# Patient Record
Sex: FEMALE | Race: WHITE | NOT HISPANIC OR LATINO | Employment: FULL TIME | URBAN - NONMETROPOLITAN AREA
[De-identification: names, ages, dates, MRNs, and addresses within clinical notes are randomized per-mention and may not be internally consistent; named-entity substitution may affect disease eponyms.]

---

## 2017-01-24 ENCOUNTER — OFFICE VISIT (OUTPATIENT)
Dept: FAMILY MEDICINE CLINIC | Facility: CLINIC | Age: 27
End: 2017-01-24

## 2017-01-24 VITALS
HEART RATE: 78 BPM | SYSTOLIC BLOOD PRESSURE: 118 MMHG | TEMPERATURE: 97.6 F | OXYGEN SATURATION: 99 % | WEIGHT: 244 LBS | DIASTOLIC BLOOD PRESSURE: 78 MMHG

## 2017-01-24 DIAGNOSIS — R53.83 OTHER FATIGUE: ICD-10-CM

## 2017-01-24 DIAGNOSIS — N91.2 AMENORRHEA: Primary | ICD-10-CM

## 2017-01-24 LAB
B-HCG UR QL: POSITIVE
INTERNAL NEGATIVE CONTROL: NEGATIVE
INTERNAL POSITIVE CONTROL: POSITIVE
Lab: ABNORMAL

## 2017-01-24 PROCEDURE — 99213 OFFICE O/P EST LOW 20 MIN: CPT | Performed by: NURSE PRACTITIONER

## 2017-01-24 PROCEDURE — 81025 URINE PREGNANCY TEST: CPT | Performed by: NURSE PRACTITIONER

## 2017-01-24 PROCEDURE — 36415 COLL VENOUS BLD VENIPUNCTURE: CPT | Performed by: NURSE PRACTITIONER

## 2017-01-24 RX ORDER — METFORMIN HYDROCHLORIDE 500 MG/1
3 TABLET, EXTENDED RELEASE ORAL DAILY
COMMUNITY
Start: 2016-12-29 | End: 2020-04-14

## 2017-01-24 RX ORDER — IBUPROFEN 800 MG/1
1 TABLET ORAL DAILY
COMMUNITY
Start: 2016-12-24 | End: 2020-04-14

## 2017-01-24 NOTE — MR AVS SNAPSHOT
Maria Ines Mccarthy   1/24/2017 2:30 PM   Office Visit    Dept Phone:  [phone]   Encounter #:  [id number]    Provider:  DEBRA Stevenson   Department:  [facility]                Your Full Care Plan              Your Updated Medication List          This list is accurate as of: 1/24/17  3:10 PM.  Always use your most recent med list.                ibuprofen 800 MG tablet   Commonly known as:  ADVIL,MOTRIN       metFORMIN  MG 24 hr tablet   Commonly known as:  GLUCOPHAGE-XR       VENTOLIN  (90 BASE) MCG/ACT inhaler   Generic drug:  albuterol               We Performed the Following     HCG, B-subunit, Quantitative     POC Pregnancy, Urine       You Were Diagnosed With        Codes Comments    Amenorrhea    -  Primary ICD-10-CM: N91.2  ICD-9-CM: 626.0     Other fatigue     ICD-10-CM: R53.83  ICD-9-CM: 780.79       Instructions     None    Patient Instructions History      Upcoming Appointments     Visit Type Date Time Department    SAME DAY 1/24/2017  2:30 PM [facility]      [employer] Signup     Our records indicate that you have declined [employer] signup. If you would like to sign up for [employer], please email [email or web address] or call [phone] to obtain an activation code.             Other Info from Your Visit           Allergies     No Known Allergies      Reason for Visit     Amenorrhea     Generalized Body Aches           Vital Signs     Blood Pressure Pulse Temperature Weight Oxygen Saturation Smoking Status    118/78 78 97.6 °F (36.4 °C) 244 lb (111 kg) 99% Current Every Day Smoker      Problems and Diagnoses Noted     Loss of menstrual periods    -  Primary    Malaise and fatigue

## 2017-01-24 NOTE — PROGRESS NOTES
Subjective   Maria Ines Mccarthy is a 26 y.o. female.     History of Present Illness     Amenorrhea  Maria Ines comes in today with generalized fatigue, decreased energy and breast tenderness.  Last Mr.  Was 12/03/2016.  She is apparently been on metformin for 3 months in the attempt to get pregnant.    The following portions of the patient's history were reviewed and updated as appropriate: allergies, current medications, past family history, past medical history, past social history, past surgical history and problem list.    Review of Systems   Constitutional: Positive for fatigue.   HENT: Negative.    Respiratory: Negative.    Cardiovascular: Negative.    Gastrointestinal: Negative.    Musculoskeletal: Negative for arthralgias and myalgias.   Neurological: Negative.        Objective   Physical Exam   Constitutional: She is oriented to person, place, and time. She appears well-developed and well-nourished. No distress.   HENT:   Head: Normocephalic and atraumatic.   Eyes: Conjunctivae are normal. Pupils are equal, round, and reactive to light.   Neck: Neck supple.   Cardiovascular: Normal rate and regular rhythm.    Pulmonary/Chest: Effort normal and breath sounds normal.   Neurological: She is alert and oriented to person, place, and time.   Skin: Skin is warm.       Assessment/Plan   Maria Ines was seen today for amenorrhea and generalized body aches.    Diagnoses and all orders for this visit:    Amenorrhea  -     HCG, B-subunit, Quantitative  -     POC Pregnancy, Urine    Other fatigue      UPT in house is positive, we have drawn a serum quantitative hCG for confirmation, I will call her tomorrow with results.

## 2017-01-25 ENCOUNTER — TELEPHONE (OUTPATIENT)
Dept: FAMILY MEDICINE CLINIC | Facility: CLINIC | Age: 27
End: 2017-01-25

## 2017-01-25 LAB — HCG INTACT+B SERPL-ACNC: NORMAL MIU/ML

## 2017-01-25 NOTE — TELEPHONE ENCOUNTER
----- Message from DEBRA Stevenson sent at 1/25/2017  7:37 AM EST -----  Please let Maria Ines know her serum pregnancy test is positive, it appears she is about 7-8 weeks

## 2017-01-25 NOTE — TELEPHONE ENCOUNTER
[facility] CALLED THIS MORNING AND WANTED ME TO ASK YOU A QUESTION 2 YEARS AGO SHE WAS ON THE HCG DIET AND WANTED TO KNOW IF THIS COULD HAVE AFFECTED HER PREGNANCY TEST

## 2017-01-25 NOTE — TELEPHONE ENCOUNTER
Pt advised of lab results and wanted to have us send these results to Josseline Zamorano.  She will call us if she needs us.

## 2017-01-25 NOTE — PROGRESS NOTES
Please let Maria Ines know her serum pregnancy test is positive, it appears she is about 7-8 weeks

## 2017-01-26 NOTE — TELEPHONE ENCOUNTER
i do not know for certain but I think it is highly unlikely this would cause a false positive as it was years ago.

## 2017-03-06 ENCOUNTER — OFFICE VISIT (OUTPATIENT)
Dept: FAMILY MEDICINE CLINIC | Facility: CLINIC | Age: 27
End: 2017-03-06

## 2017-03-06 VITALS
OXYGEN SATURATION: 99 % | TEMPERATURE: 97.6 F | SYSTOLIC BLOOD PRESSURE: 124 MMHG | HEART RATE: 84 BPM | WEIGHT: 247 LBS | DIASTOLIC BLOOD PRESSURE: 82 MMHG | HEIGHT: 64 IN | BODY MASS INDEX: 42.17 KG/M2

## 2017-03-06 DIAGNOSIS — R05.9 COUGH: Primary | ICD-10-CM

## 2017-03-06 LAB
EXPIRATION DATE: NORMAL
FLUAV AG NPH QL: NEGATIVE
FLUBV AG NPH QL: NEGATIVE
INTERNAL CONTROL: NORMAL
Lab: NORMAL

## 2017-03-06 PROCEDURE — 87804 INFLUENZA ASSAY W/OPTIC: CPT | Performed by: NURSE PRACTITIONER

## 2017-03-06 PROCEDURE — 99213 OFFICE O/P EST LOW 20 MIN: CPT | Performed by: NURSE PRACTITIONER

## 2017-03-06 RX ORDER — HYDROXYZINE HYDROCHLORIDE 25 MG/1
1 TABLET, FILM COATED ORAL AS NEEDED
COMMUNITY
Start: 2017-02-09 | End: 2020-04-14

## 2017-03-06 NOTE — PROGRESS NOTES
Subjective   Maria Ines Mccarthy is a 26 y.o. female.     History of Present Illness   Maria Ines is here today with complaint of cough and congestion for 1 week, also having some chest and breast tenderness, but is 13 weeks pregnant She thinks she may have the Flu, is still smoking, but plans to quit. Denies fever and chills.  The following portions of the patient's history were reviewed and updated as appropriate: allergies, current medications, past family history, past medical history, past social history, past surgical history and problem list.    Review of Systems   Constitutional: Negative.    HENT: Positive for congestion and rhinorrhea. Negative for ear discharge, ear pain, sinus pressure, sneezing and sore throat.    Eyes: Negative.    Respiratory: Positive for cough. Negative for apnea, choking, chest tightness, shortness of breath, wheezing and stridor.    Cardiovascular: Negative.    Gastrointestinal: Negative.    Endocrine: Negative.    Musculoskeletal: Negative.    Skin: Negative.    Neurological: Negative.    Hematological: Negative.    Psychiatric/Behavioral: Negative.        Objective   Physical Exam   Constitutional: She is oriented to person, place, and time. She appears well-developed and well-nourished. No distress.   HENT:   Head: Normocephalic and atraumatic.   Right Ear: Tympanic membrane, external ear and ear canal normal.   Left Ear: Tympanic membrane, external ear and ear canal normal.   Nose: Nose normal.   Mouth/Throat: Oropharynx is clear and moist and mucous membranes are normal. No oropharyngeal exudate, posterior oropharyngeal edema or posterior oropharyngeal erythema.   Eyes: Conjunctivae and EOM are normal. No scleral icterus.   Neck: Normal range of motion. Neck supple.   Cardiovascular: Normal rate, regular rhythm and normal heart sounds.  Exam reveals no friction rub.    No murmur heard.  No bruit   Pulmonary/Chest: Effort normal. No respiratory distress. She has no rales.   Scattered  rhonchi that clears with cough   Lymphadenopathy:     She has no cervical adenopathy.   Neurological: She is alert and oriented to person, place, and time.   Skin: Skin is warm. No erythema. No pallor.   No cyanosis   Psychiatric: She has a normal mood and affect. Her behavior is normal. Judgment and thought content normal.   Nursing note and vitals reviewed.      Assessment/Plan   Maria Ines was seen today for cough.    Diagnoses and all orders for this visit:    Cough  -     POCT Influenza A/B      Results for orders placed or performed in visit on 03/06/17   POCT Influenza A/B   Result Value Ref Range    Rapid Influenza A Ag negative     Rapid Influenza B Ag negative     Internal Control Passed Passed    Lot Number [id number]     Expiration Date 6/18      Flu test negative in clinic today, I gave patient a written prescription for Amoxicillin and told her to fill if symptoms do not improve in a day or two. I advised patient to quit smoking, increase fluids, she may take benadryl during pregnancy for symptom relief.   Patient was encouraged to keep me informed of any acute changes, lack of improvement, or any new concerning symptoms.Smoking cessation advised.  Pt voiced understanding of health risks of cont' smoking.  Patient verbalized understanding of instructions and denied further questions.  Written by Catherine TAYLOR, APRN student, acting as a scribe for DEBRA Edmonds  This note accurately reflects work and decisions made by myself, Conchis WATSON

## 2020-04-14 ENCOUNTER — APPOINTMENT (OUTPATIENT)
Dept: CT IMAGING | Facility: HOSPITAL | Age: 30
End: 2020-04-14

## 2020-04-14 ENCOUNTER — HOSPITAL ENCOUNTER (EMERGENCY)
Facility: HOSPITAL | Age: 30
Discharge: HOME | End: 2020-04-14
Attending: EMERGENCY MEDICINE

## 2020-04-14 VITALS
HEIGHT: 64 IN | TEMPERATURE: 98.1 F | DIASTOLIC BLOOD PRESSURE: 72 MMHG | OXYGEN SATURATION: 99 % | WEIGHT: 156 LBS | BODY MASS INDEX: 26.63 KG/M2 | RESPIRATION RATE: 18 BRPM | SYSTOLIC BLOOD PRESSURE: 126 MMHG | HEART RATE: 87 BPM

## 2020-04-14 DIAGNOSIS — R33.8 ACUTE URINARY RETENTION: ICD-10-CM

## 2020-04-14 DIAGNOSIS — K62.89 PROCTITIS: Primary | ICD-10-CM

## 2020-04-14 LAB
B-HCG UR QL: NEGATIVE
BILIRUB UR QL STRIP: NEGATIVE
CLARITY UR: CLEAR
COLOR UR: YELLOW
GLUCOSE UR STRIP-MCNC: NEGATIVE MG/DL
HGB UR QL STRIP.AUTO: NEGATIVE
INTERNAL NEGATIVE CONTROL: NEGATIVE
INTERNAL POSITIVE CONTROL: POSITIVE
KETONES UR QL STRIP: NEGATIVE
LEUKOCYTE ESTERASE UR QL STRIP.AUTO: NEGATIVE
Lab: NORMAL
NITRITE UR QL STRIP: NEGATIVE
PH UR STRIP.AUTO: 7 [PH] (ref 5–8)
PROT UR QL STRIP: NEGATIVE
SP GR UR STRIP: 1.01 (ref 1–1.03)
UROBILINOGEN UR QL STRIP: NORMAL

## 2020-04-14 PROCEDURE — 74176 CT ABD & PELVIS W/O CONTRAST: CPT

## 2020-04-14 PROCEDURE — 81003 URINALYSIS AUTO W/O SCOPE: CPT | Performed by: EMERGENCY MEDICINE

## 2020-04-14 PROCEDURE — 81025 URINE PREGNANCY TEST: CPT | Performed by: EMERGENCY MEDICINE

## 2020-04-14 PROCEDURE — 51702 INSERT TEMP BLADDER CATH: CPT

## 2020-04-14 PROCEDURE — 99283 EMERGENCY DEPT VISIT LOW MDM: CPT

## 2020-04-14 RX ORDER — AMOXICILLIN AND CLAVULANATE POTASSIUM 875; 125 MG/1; MG/1
1 TABLET, FILM COATED ORAL EVERY 12 HOURS
Qty: 14 TABLET | Refills: 0 | Status: SHIPPED | OUTPATIENT
Start: 2020-04-14 | End: 2020-04-21

## 2020-04-14 RX ORDER — MULTIVIT AND MINERALS-FERROUS GLUCONATE 9 MG IRON/15 ML ORAL LIQUID 9 MG/15 ML
LIQUID (ML) ORAL DAILY
COMMUNITY

## 2020-04-14 NOTE — ED PROVIDER NOTES
Subjective   29-year-old female presents with the complaint of pain in the lower central portion of her back.  She also reports that she has had difficulty urinating for the last 3 days.  She reports that she was actually discharged from [facility] yesterday.  She was admitted at [facility] approximately 3 days for upper abdominal pain.  She reports she received a scope and was identified to have an ulcer in her upper gastrointestinal system.  She does have a previous history of a gastric sleeve.  She states that she inform them of her lower abdominal pain and low back pain, but did not receive any evaluation while she was there.  She was discharged from the hospital with oxycodone, she reports taking it without any relief of her current pain.  She denies any fever.  No chest pain or difficulty breathing.  No cough or shortness of breath.  No other reported aggravating, alleviating, or associated symptoms.          Review of Systems   Constitutional: Negative for chills, fatigue and fever.   HENT: Negative for congestion, ear pain, postnasal drip, sinus pressure and sore throat.    Eyes: Negative for pain, redness and visual disturbance.   Respiratory: Negative for cough, chest tightness and shortness of breath.    Cardiovascular: Negative for chest pain, palpitations and leg swelling.   Gastrointestinal: Positive for abdominal pain. Negative for anal bleeding, blood in stool, diarrhea, nausea and vomiting.   Endocrine: Negative for polydipsia and polyuria.   Genitourinary: Positive for difficulty urinating. Negative for dysuria, frequency and urgency.   Musculoskeletal: Positive for back pain (low back pain). Negative for arthralgias and neck pain.   Skin: Negative for pallor and rash.   Allergic/Immunologic: Negative for environmental allergies and immunocompromised state.   Neurological: Negative for dizziness, weakness and headaches.   Hematological: Negative for adenopathy.    Psychiatric/Behavioral: Negative for confusion, self-injury and suicidal ideas. The patient is not nervous/anxious.    All other systems reviewed and are negative.      Past Medical History:   Diagnosis Date   • Anxiety    • Asthma    • Dysuria    • Glossitis    •  0    • Sore throat    • Strep pharyngitis        No Known Allergies    Past Surgical History:   Procedure Laterality Date   • CHOLECYSTECTOMY     • GASTRIC SLEEVE LAPAROSCOPIC         History reviewed. No pertinent family history.    Social History     Socioeconomic History   • Marital status: Single     Spouse name: Not on file   • Number of children: Not on file   • Years of education: Not on file   • Highest education level: Not on file   Tobacco Use   • Smoking status: Current Every Day Smoker   • Tobacco comment: 1-5 A DAY   Substance and Sexual Activity   • Alcohol use: Not Currently           Objective   Physical Exam   Constitutional: She is oriented to person, place, and time. She appears well-developed and well-nourished.  Non-toxic appearance. No distress.   HENT:   Head: Normocephalic and atraumatic.   Right Ear: External ear normal.   Left Ear: External ear normal.   Nose: Nose normal.   Eyes: Pupils are equal, round, and reactive to light. EOM and lids are normal.   Neck: Normal range of motion. Neck supple. No tracheal deviation present.   Cardiovascular: Normal rate, regular rhythm and normal heart sounds. Exam reveals no gallop, no friction rub and no decreased pulses.   No murmur heard.  Pulmonary/Chest: Effort normal and breath sounds normal. No respiratory distress. She has no decreased breath sounds. She has no wheezes. She has no rhonchi. She has no rales.   Abdominal: Soft. Normal appearance and bowel sounds are normal. There is tenderness in the suprapubic area. There is no rebound, no guarding, no tenderness at McBurney's point and negative Capps's sign.   Musculoskeletal: Normal range of motion. She exhibits no  deformity.   Lymphadenopathy:     She has no cervical adenopathy.   Neurological: She is alert and oriented to person, place, and time. She has normal strength. No cranial nerve deficit or sensory deficit.   Skin: Skin is warm and dry. No rash noted. She is not diaphoretic.   Psychiatric: She has a normal mood and affect. Her speech is normal and behavior is normal. Judgment and thought content normal. Cognition and memory are normal.   Nursing note and vitals reviewed.      Procedures           ED Course                                           MDM  Number of Diagnoses or Management Options  Acute urinary retention: new and requires workup  Proctitis: new and requires workup  Diagnosis management comments: Urine is clean with no signs of infection.  Garcia catheter was anchored.    CT scan is concerning for proctitis.  Patient denies history of ulcerative colitis or Crohn's disease.    The patient will be discharged with a course of Augmentin and will be referred to either colorectal surgery, Dr. Alvarez, or GI, Dr. Gusman.    She will also be referred to urology for further evaluation of urinary retention.    She is already been prescribed oxycodone and she is advised to take that as needed to help with pain.    Advised to rest and drink plenty of fluids.       Amount and/or Complexity of Data Reviewed  Clinical lab tests: ordered and reviewed  Tests in the radiology section of CPT®: ordered and reviewed  Review and summarize past medical records: yes  Independent visualization of images, tracings, or specimens: yes        Final diagnoses:   Proctitis   Acute urinary retention            Leighann Sanchez MD  04/14/20 0438

## 2020-04-14 NOTE — DISCHARGE INSTRUCTIONS
Advised to take antibiotics as prescribed.    Leave Garcia catheter in place until you follow-up with urologist within the next week.    Follow-up with GI or colorectal surgery for further outpatient evaluation for cause of proctitis.

## 2020-12-21 ENCOUNTER — APPOINTMENT (OUTPATIENT)
Dept: CT IMAGING | Facility: HOSPITAL | Age: 30
End: 2020-12-21

## 2020-12-21 ENCOUNTER — HOSPITAL ENCOUNTER (EMERGENCY)
Facility: HOSPITAL | Age: 30
Discharge: HOME | End: 2020-12-22
Attending: EMERGENCY MEDICINE | Admitting: EMERGENCY MEDICINE

## 2020-12-21 ENCOUNTER — APPOINTMENT (OUTPATIENT)
Dept: GENERAL RADIOLOGY | Facility: HOSPITAL | Age: 30
End: 2020-12-21

## 2020-12-21 DIAGNOSIS — R10.13 EPIGASTRIC ABDOMINAL PAIN: ICD-10-CM

## 2020-12-21 DIAGNOSIS — R10.33 PERIUMBILICAL ABDOMINAL PAIN: Primary | ICD-10-CM

## 2020-12-21 DIAGNOSIS — R11.2 NON-INTRACTABLE VOMITING WITH NAUSEA, UNSPECIFIED VOMITING TYPE: ICD-10-CM

## 2020-12-21 LAB
ALBUMIN SERPL-MCNC: 4.9 G/DL (ref 3.5–5.2)
ALBUMIN/GLOB SERPL: 1.4 G/DL
ALP SERPL-CCNC: 48 U/L (ref 39–117)
ALT SERPL W P-5'-P-CCNC: 17 U/L (ref 1–33)
ANION GAP SERPL CALCULATED.3IONS-SCNC: 14 MMOL/L (ref 5–15)
AST SERPL-CCNC: 18 U/L (ref 1–32)
B-HCG UR QL: NEGATIVE
BACTERIA UR QL AUTO: ABNORMAL /HPF
BASOPHILS # BLD AUTO: 0.01 10*3/MM3 (ref 0–0.2)
BASOPHILS NFR BLD AUTO: 0.2 % (ref 0–1.5)
BILIRUB SERPL-MCNC: 0.4 MG/DL (ref 0–1.2)
BILIRUB UR QL STRIP: NEGATIVE
BUN SERPL-MCNC: 13 MG/DL (ref 6–20)
BUN/CREAT SERPL: 24.1 (ref 7–25)
CALCIUM SPEC-SCNC: 9.7 MG/DL (ref 8.6–10.5)
CHLORIDE SERPL-SCNC: 107 MMOL/L (ref 98–107)
CLARITY UR: ABNORMAL
CO2 SERPL-SCNC: 19 MMOL/L (ref 22–29)
COLOR UR: YELLOW
CREAT SERPL-MCNC: 0.54 MG/DL (ref 0.57–1)
D-LACTATE SERPL-SCNC: 1.2 MMOL/L (ref 0.5–2)
DEPRECATED RDW RBC AUTO: 38.4 FL (ref 37–54)
EOSINOPHIL # BLD AUTO: 0.01 10*3/MM3 (ref 0–0.4)
EOSINOPHIL NFR BLD AUTO: 0.2 % (ref 0.3–6.2)
ERYTHROCYTE [DISTWIDTH] IN BLOOD BY AUTOMATED COUNT: 11.9 % (ref 12.3–15.4)
GFR SERPL CREATININE-BSD FRML MDRD: 134 ML/MIN/1.73
GLOBULIN UR ELPH-MCNC: 3.4 GM/DL
GLUCOSE SERPL-MCNC: 148 MG/DL (ref 65–99)
GLUCOSE UR STRIP-MCNC: NEGATIVE MG/DL
HCT VFR BLD AUTO: 41.7 % (ref 34–46.6)
HGB BLD-MCNC: 13.8 G/DL (ref 12–15.9)
HGB UR QL STRIP.AUTO: NEGATIVE
HOLD SPECIMEN: NORMAL
HOLD SPECIMEN: NORMAL
HYALINE CASTS UR QL AUTO: ABNORMAL /LPF
IMM GRANULOCYTES # BLD AUTO: 0.02 10*3/MM3 (ref 0–0.05)
IMM GRANULOCYTES NFR BLD AUTO: 0.3 % (ref 0–0.5)
INTERNAL NEGATIVE CONTROL: NEGATIVE
INTERNAL POSITIVE CONTROL: POSITIVE
KETONES UR QL STRIP: ABNORMAL
LEUKOCYTE ESTERASE UR QL STRIP.AUTO: NEGATIVE
LIPASE SERPL-CCNC: 49 U/L (ref 13–60)
LYMPHOCYTES # BLD AUTO: 0.94 10*3/MM3 (ref 0.7–3.1)
LYMPHOCYTES NFR BLD AUTO: 14.6 % (ref 19.6–45.3)
Lab: NORMAL
MCH RBC QN AUTO: 29 PG (ref 26.6–33)
MCHC RBC AUTO-ENTMCNC: 33.1 G/DL (ref 31.5–35.7)
MCV RBC AUTO: 87.6 FL (ref 79–97)
MONOCYTES # BLD AUTO: 0.05 10*3/MM3 (ref 0.1–0.9)
MONOCYTES NFR BLD AUTO: 0.8 % (ref 5–12)
NEUTROPHILS NFR BLD AUTO: 5.43 10*3/MM3 (ref 1.7–7)
NEUTROPHILS NFR BLD AUTO: 83.9 % (ref 42.7–76)
NITRITE UR QL STRIP: NEGATIVE
NRBC BLD AUTO-RTO: 0 /100 WBC (ref 0–0.2)
PH UR STRIP.AUTO: <=5 [PH] (ref 5–8)
PLATELET # BLD AUTO: 301 10*3/MM3 (ref 140–450)
PMV BLD AUTO: 9.7 FL (ref 6–12)
POTASSIUM SERPL-SCNC: 4.3 MMOL/L (ref 3.5–5.2)
PROT SERPL-MCNC: 8.3 G/DL (ref 6–8.5)
PROT UR QL STRIP: NEGATIVE
RBC # BLD AUTO: 4.76 10*6/MM3 (ref 3.77–5.28)
RBC # UR: ABNORMAL /HPF
REF LAB TEST METHOD: ABNORMAL
SODIUM SERPL-SCNC: 140 MMOL/L (ref 136–145)
SP GR UR STRIP: 1.02 (ref 1–1.03)
SQUAMOUS #/AREA URNS HPF: ABNORMAL /HPF
UROBILINOGEN UR QL STRIP: ABNORMAL
WBC # BLD AUTO: 6.46 10*3/MM3 (ref 3.4–10.8)
WBC UR QL AUTO: ABNORMAL /HPF
WHOLE BLOOD HOLD SPECIMEN: NORMAL
WHOLE BLOOD HOLD SPECIMEN: NORMAL

## 2020-12-21 PROCEDURE — 74177 CT ABD & PELVIS W/CONTRAST: CPT

## 2020-12-21 PROCEDURE — 96375 TX/PRO/DX INJ NEW DRUG ADDON: CPT

## 2020-12-21 PROCEDURE — 83605 ASSAY OF LACTIC ACID: CPT | Performed by: EMERGENCY MEDICINE

## 2020-12-21 PROCEDURE — 25010000002 ONDANSETRON PER 1 MG: Performed by: EMERGENCY MEDICINE

## 2020-12-21 PROCEDURE — 96374 THER/PROPH/DIAG INJ IV PUSH: CPT

## 2020-12-21 PROCEDURE — 25010000002 HYDROMORPHONE PER 4 MG: Performed by: EMERGENCY MEDICINE

## 2020-12-21 PROCEDURE — 80053 COMPREHEN METABOLIC PANEL: CPT

## 2020-12-21 PROCEDURE — 99283 EMERGENCY DEPT VISIT LOW MDM: CPT

## 2020-12-21 PROCEDURE — 83690 ASSAY OF LIPASE: CPT

## 2020-12-21 PROCEDURE — 71045 X-RAY EXAM CHEST 1 VIEW: CPT

## 2020-12-21 PROCEDURE — 81025 URINE PREGNANCY TEST: CPT | Performed by: EMERGENCY MEDICINE

## 2020-12-21 PROCEDURE — 81001 URINALYSIS AUTO W/SCOPE: CPT | Performed by: EMERGENCY MEDICINE

## 2020-12-21 PROCEDURE — 85025 COMPLETE CBC W/AUTO DIFF WBC: CPT

## 2020-12-21 RX ORDER — ONDANSETRON 2 MG/ML
4 INJECTION INTRAMUSCULAR; INTRAVENOUS ONCE
Status: COMPLETED | OUTPATIENT
Start: 2020-12-21 | End: 2020-12-21

## 2020-12-21 RX ORDER — SODIUM CHLORIDE 9 MG/ML
10 INJECTION INTRAVENOUS AS NEEDED
Status: DISCONTINUED | OUTPATIENT
Start: 2020-12-21 | End: 2020-12-22 | Stop reason: HOSPADM

## 2020-12-21 RX ORDER — HYDROMORPHONE HYDROCHLORIDE 1 MG/ML
0.5 INJECTION, SOLUTION INTRAMUSCULAR; INTRAVENOUS; SUBCUTANEOUS ONCE
Status: COMPLETED | OUTPATIENT
Start: 2020-12-21 | End: 2020-12-21

## 2020-12-21 RX ADMIN — ONDANSETRON 4 MG: 2 INJECTION INTRAMUSCULAR; INTRAVENOUS at 23:10

## 2020-12-21 RX ADMIN — SODIUM CHLORIDE 1000 ML: 9 INJECTION, SOLUTION INTRAVENOUS at 23:09

## 2020-12-21 RX ADMIN — IOPAMIDOL 100 ML: 612 INJECTION, SOLUTION INTRAVENOUS at 23:58

## 2020-12-21 RX ADMIN — HYDROMORPHONE HYDROCHLORIDE 0.5 MG: 1 INJECTION, SOLUTION INTRAMUSCULAR; INTRAVENOUS; SUBCUTANEOUS at 23:09

## 2020-12-22 VITALS
BODY MASS INDEX: 26.46 KG/M2 | SYSTOLIC BLOOD PRESSURE: 99 MMHG | HEIGHT: 64 IN | RESPIRATION RATE: 16 BRPM | DIASTOLIC BLOOD PRESSURE: 56 MMHG | TEMPERATURE: 98.6 F | OXYGEN SATURATION: 98 % | HEART RATE: 103 BPM | WEIGHT: 155 LBS

## 2020-12-22 PROCEDURE — 25010000002 HYDROMORPHONE PER 4 MG: Performed by: EMERGENCY MEDICINE

## 2020-12-22 PROCEDURE — 25010000002 ONDANSETRON PER 1 MG: Performed by: EMERGENCY MEDICINE

## 2020-12-22 PROCEDURE — 25010000002 IOPAMIDOL 61 % SOLUTION: Performed by: EMERGENCY MEDICINE

## 2020-12-22 PROCEDURE — 96376 TX/PRO/DX INJ SAME DRUG ADON: CPT

## 2020-12-22 RX ORDER — ONDANSETRON 4 MG/1
4 TABLET, FILM COATED ORAL EVERY 8 HOURS PRN
Qty: 15 TABLET | Refills: 0 | Status: SHIPPED | OUTPATIENT
Start: 2020-12-22 | End: 2021-01-23 | Stop reason: SDUPTHER

## 2020-12-22 RX ORDER — ONDANSETRON 2 MG/ML
4 INJECTION INTRAMUSCULAR; INTRAVENOUS ONCE
Status: COMPLETED | OUTPATIENT
Start: 2020-12-22 | End: 2020-12-22

## 2020-12-22 RX ORDER — HYDROMORPHONE HYDROCHLORIDE 1 MG/ML
0.5 INJECTION, SOLUTION INTRAMUSCULAR; INTRAVENOUS; SUBCUTANEOUS ONCE
Status: COMPLETED | OUTPATIENT
Start: 2020-12-22 | End: 2020-12-22

## 2020-12-22 RX ORDER — DICYCLOMINE HCL 20 MG
20 TABLET ORAL EVERY 8 HOURS PRN
Qty: 20 TABLET | Refills: 0 | OUTPATIENT
Start: 2020-12-22 | End: 2021-01-23

## 2020-12-22 RX ORDER — HYDROMORPHONE HYDROCHLORIDE 1 MG/ML
0.5 INJECTION, SOLUTION INTRAMUSCULAR; INTRAVENOUS; SUBCUTANEOUS
Status: DISCONTINUED | OUTPATIENT
Start: 2020-12-22 | End: 2020-12-22

## 2020-12-22 RX ADMIN — HYDROMORPHONE HYDROCHLORIDE 0.5 MG: 1 INJECTION, SOLUTION INTRAMUSCULAR; INTRAVENOUS; SUBCUTANEOUS at 00:10

## 2020-12-22 RX ADMIN — ONDANSETRON 4 MG: 2 INJECTION INTRAMUSCULAR; INTRAVENOUS at 00:11

## 2020-12-22 NOTE — ED PROVIDER NOTES
Subjective   29-year-old female presents for evaluation of abdominal pain and nausea/vomiting.  Of note, the patient states that she had a gastric sleeve back in 2018.  She did have postoperative complications including pancreatitis.  Tonight, at approximately 5 PM she began experiencing diffuse abdominal pain followed by nausea and vomiting that has persisted since that time.  She was concerned that her symptoms could be related to her gastric sleeve, prompting her visit to the emergency department.  She denies any diarrhea.  No fevers.  No chills.  No sick contacts.  No recent diet changes.  No known exposures to anyone with the novel coronavirus.  Pain is currently 9 out of 10 in severity with no aggravating or alleviating factors.          Review of Systems   Gastrointestinal: Positive for abdominal pain, nausea and vomiting.   All other systems reviewed and are negative.      Past Medical History:   Diagnosis Date   • Anxiety    • Asthma    • Dysuria    • Glossitis    •  0    • Sore throat    • Strep pharyngitis        Allergies   Allergen Reactions   • Nsaids Hives       Past Surgical History:   Procedure Laterality Date   • CHOLECYSTECTOMY     • GASTRIC SLEEVE LAPAROSCOPIC         No family history on file.    Social History     Socioeconomic History   • Marital status: Single     Spouse name: Not on file   • Number of children: Not on file   • Years of education: Not on file   • Highest education level: Not on file   Tobacco Use   • Smoking status: Current Every Day Smoker   • Tobacco comment: 1-5 A DAY   Substance and Sexual Activity   • Alcohol use: Not Currently           Objective   Physical Exam  Vitals signs and nursing note reviewed.   Constitutional:       Appearance: She is well-developed. She is not diaphoretic.      Comments: Nontoxic-appearing but uncomfortable appearing female   HENT:      Head: Normocephalic and atraumatic.   Eyes:      Pupils: Pupils are equal, round, and reactive to  light.   Neck:      Musculoskeletal: Normal range of motion and neck supple.   Cardiovascular:      Rate and Rhythm: Normal rate and regular rhythm.      Heart sounds: Normal heart sounds. No murmur. No friction rub. No gallop.    Pulmonary:      Effort: Pulmonary effort is normal. No respiratory distress.      Breath sounds: Normal breath sounds. No wheezing or rales.   Abdominal:      General: Bowel sounds are normal. There is no distension.      Palpations: Abdomen is soft. There is no mass.      Tenderness: There is abdominal tenderness. There is guarding. There is no rebound.      Comments: Focal, periumbilical and epigastric abdominal tenderness noted with voluntary guarding present, no pain out of proportion to exam   Genitourinary:     Comments: No CVA tenderness present  Musculoskeletal: Normal range of motion.   Skin:     General: Skin is warm and dry.      Findings: No erythema or rash.   Neurological:      General: No focal deficit present.      Mental Status: She is alert and oriented to person, place, and time.   Psychiatric:         Mood and Affect: Mood normal.         Thought Content: Thought content normal.         Judgment: Judgment normal.         Procedures           ED Course  ED Course as of Dec 22 0258   Mon Dec 21, 2020   2304 29-year-old female presents for evaluation of abdominal pain and nausea/vomiting.  She states that her symptoms started acutely this evening at approximately 5 PM and have persisted since that time.  She was concerned that her symptoms could potentially be related to her gastric sleeve and came to the emergency department for evaluation.  Upon my evaluation, the patient is nontoxic-appearing but uncomfortable.  Exam remarkable for focal mid abdominal tenderness with voluntary guarding noted.  No pain out of proportion to exam.  No CVA tenderness present.  Labs are unrevealing.  We will obtain advanced imaging, and we will reassess following initial interventions.     [DD]   2339 Chest x-ray is negative.  Labs are unrevealing.    [DD]   Tue Dec 22, 2020   0032 CT of abdomen/pelvis was unremarkable and negative for acute/emergent/surgical intra-abdominal process.    [DD]   0035 Upon reevaluation, patient is much improved.  Reassured and counseled regarding symptomatic management.  Scripts for both Bentyl and Zofran as needed.  The patient will follow up with her primary care physician within the next week.  Agreeable with plan and given appropriate strict return precautions.    [DD]      ED Course User Index  [DD] Bubba Persaud MD                                   Recent Results (from the past 24 hour(s))   Comprehensive Metabolic Panel    Collection Time: 12/21/20  9:38 PM    Specimen: Blood   Result Value Ref Range    Glucose 148 (H) 65 - 99 mg/dL    BUN 13 6 - 20 mg/dL    Creatinine 0.54 (L) 0.57 - 1.00 mg/dL    Sodium 140 136 - 145 mmol/L    Potassium 4.3 3.5 - 5.2 mmol/L    Chloride 107 98 - 107 mmol/L    CO2 19.0 (L) 22.0 - 29.0 mmol/L    Calcium 9.7 8.6 - 10.5 mg/dL    Total Protein 8.3 6.0 - 8.5 g/dL    Albumin 4.90 3.50 - 5.20 g/dL    ALT (SGPT) 17 1 - 33 U/L    AST (SGOT) 18 1 - 32 U/L    Alkaline Phosphatase 48 39 - 117 U/L    Total Bilirubin 0.4 0.0 - 1.2 mg/dL    eGFR Non African Amer 134 >60 mL/min/1.73    Globulin 3.4 gm/dL    A/G Ratio 1.4 g/dL    BUN/Creatinine Ratio 24.1 7.0 - 25.0    Anion Gap 14.0 5.0 - 15.0 mmol/L   Lipase    Collection Time: 12/21/20  9:38 PM    Specimen: Blood   Result Value Ref Range    Lipase 49 13 - 60 U/L   Light Blue Top    Collection Time: 12/21/20  9:38 PM   Result Value Ref Range    Extra Tube hold for add-on    Green Top (Gel)    Collection Time: 12/21/20  9:38 PM   Result Value Ref Range    Extra Tube Hold for add-ons.    Lavender Top    Collection Time: 12/21/20  9:38 PM   Result Value Ref Range    Extra Tube hold for add-on    Gold Top - SST    Collection Time: 12/21/20  9:38 PM   Result Value Ref Range    Extra Tube Hold  for add-ons.    CBC Auto Differential    Collection Time: 12/21/20  9:38 PM    Specimen: Blood   Result Value Ref Range    WBC 6.46 3.40 - 10.80 10*3/mm3    RBC 4.76 3.77 - 5.28 10*6/mm3    Hemoglobin 13.8 12.0 - 15.9 g/dL    Hematocrit 41.7 34.0 - 46.6 %    MCV 87.6 79.0 - 97.0 fL    MCH 29.0 26.6 - 33.0 pg    MCHC 33.1 31.5 - 35.7 g/dL    RDW 11.9 (L) 12.3 - 15.4 %    RDW-SD 38.4 37.0 - 54.0 fl    MPV 9.7 6.0 - 12.0 fL    Platelets 301 140 - 450 10*3/mm3    Neutrophil % 83.9 (H) 42.7 - 76.0 %    Lymphocyte % 14.6 (L) 19.6 - 45.3 %    Monocyte % 0.8 (L) 5.0 - 12.0 %    Eosinophil % 0.2 (L) 0.3 - 6.2 %    Basophil % 0.2 0.0 - 1.5 %    Immature Grans % 0.3 0.0 - 0.5 %    Neutrophils, Absolute 5.43 1.70 - 7.00 10*3/mm3    Lymphocytes, Absolute 0.94 0.70 - 3.10 10*3/mm3    Monocytes, Absolute 0.05 (L) 0.10 - 0.90 10*3/mm3    Eosinophils, Absolute 0.01 0.00 - 0.40 10*3/mm3    Basophils, Absolute 0.01 0.00 - 0.20 10*3/mm3    Immature Grans, Absolute 0.02 0.00 - 0.05 10*3/mm3    nRBC 0.0 0.0 - 0.2 /100 WBC   Urinalysis With Microscopic If Indicated (No Culture) - Urine, Clean Catch    Collection Time: 12/21/20 10:56 PM    Specimen: Urine, Clean Catch   Result Value Ref Range    Color, UA Yellow Yellow, Straw    Appearance, UA Cloudy (A) Clear    pH, UA <=5.0 5.0 - 8.0    Specific Gravity, UA 1.022 1.001 - 1.030    Glucose, UA Negative Negative    Ketones, UA Trace (A) Negative    Bilirubin, UA Negative Negative    Blood, UA Negative Negative    Protein, UA Negative Negative    Leuk Esterase, UA Negative Negative    Nitrite, UA Negative Negative    Urobilinogen, UA 1.0 E.U./dL 0.2 - 1.0 E.U./dL   Urinalysis, Microscopic Only - Urine, Clean Catch    Collection Time: 12/21/20 10:56 PM    Specimen: Urine, Clean Catch   Result Value Ref Range    RBC, UA 3-6 (A) None Seen, 0-2 /HPF    WBC, UA 3-5 (A) None Seen, 0-2 /HPF    Bacteria, UA None Seen None Seen, Trace /HPF    Squamous Epithelial Cells, UA 3-6 (A) None Seen, 0-2 /HPF     Hyaline Casts, UA 0-6 0 - 6 /LPF    Methodology Automated Microscopy    POCT Pregnancy, Urine    Collection Time: 12/21/20 11:00 PM    Specimen: Urine   Result Value Ref Range    HCG, Urine, QL Negative Negative    Lot Number [id number]     Internal Positive Control Positive     Internal Negative Control Negative    Lactic Acid, Plasma    Collection Time: 12/21/20 11:08 PM    Specimen: Blood   Result Value Ref Range    Lactate 1.2 0.5 - 2.0 mmol/L     Note: In addition to lab results from this visit, the labs listed above may include labs taken at another facility or during a different encounter within the last 24 hours. Please correlate lab times with ED admission and discharge times for further clarification of the services performed during this visit.    CT Abdomen Pelvis With Contrast   Final Result   Normal appendix      An IUD is present      Otherwise normal               Signer Name: Geovani Lugo MD    Signed: 12/22/2020 12:29 AM    Workstation Name: [id number]     [facility]      XR Chest 1 View   Final Result   There may be trace right pleural fluid or thickening. It is uncertain if this may be chronic. The lungs are otherwise clear.      Signer Name: Debra Costa MD    Signed: 12/21/2020 11:30 PM    Workstation Name: [id number]     [facility]        Vitals:    12/21/20 2330 12/21/20 2359 12/22/20 0000 12/22/20 0030   BP: 101/58 108/71 116/71 99/56   Pulse:       Resp:       Temp:       SpO2: 97% 98% 99% 98%   Weight:       Height:         Medications   sodium chloride 0.9 % bolus 1,000 mL (0 mL Intravenous Stopped 12/21/20 2351)   HYDROmorphone (DILAUDID) injection 0.5 mg (0.5 mg Intravenous Given 12/21/20 2309)   ondansetron (ZOFRAN) injection 4 mg (4 mg Intravenous Given 12/21/20 2310)   ondansetron (ZOFRAN) injection 4 mg (4 mg Intravenous Given 12/22/20 0011)   HYDROmorphone (DILAUDID) injection 0.5 mg (0.5 mg Intravenous Given 12/22/20 0010)    iopamidol (ISOVUE-300) 61 % injection 100 mL (100 mL Intravenous Given 12/21/20 1589)     ECG/EMG Results (last 24 hours)     ** No results found for the last 24 hours. **        No orders to display               MDM    Final diagnoses:   Periumbilical abdominal pain   Epigastric abdominal pain   Non-intractable vomiting with nausea, unspecified vomiting type            Bubba Persaud MD  12/22/20 6866

## 2021-01-02 ENCOUNTER — HOSPITAL ENCOUNTER (EMERGENCY)
Facility: HOSPITAL | Age: 31
Discharge: HOME | End: 2021-01-02
Attending: EMERGENCY MEDICINE | Admitting: EMERGENCY MEDICINE

## 2021-01-02 VITALS
TEMPERATURE: 98.2 F | WEIGHT: 150 LBS | OXYGEN SATURATION: 97 % | SYSTOLIC BLOOD PRESSURE: 115 MMHG | DIASTOLIC BLOOD PRESSURE: 73 MMHG | BODY MASS INDEX: 25.61 KG/M2 | HEART RATE: 71 BPM | HEIGHT: 64 IN | RESPIRATION RATE: 20 BRPM

## 2021-01-02 DIAGNOSIS — R10.13 EPIGASTRIC PAIN: Primary | ICD-10-CM

## 2021-01-02 DIAGNOSIS — K27.9 PEPTIC ULCER DISEASE: ICD-10-CM

## 2021-01-02 DIAGNOSIS — R11.2 NON-INTRACTABLE VOMITING WITH NAUSEA, UNSPECIFIED VOMITING TYPE: ICD-10-CM

## 2021-01-02 DIAGNOSIS — K92.0 HEMATEMESIS WITH NAUSEA: ICD-10-CM

## 2021-01-02 LAB
ALBUMIN SERPL-MCNC: 4.3 G/DL (ref 3.5–5.2)
ALBUMIN/GLOB SERPL: 1.4 G/DL
ALP SERPL-CCNC: 50 U/L (ref 39–117)
ALT SERPL W P-5'-P-CCNC: 15 U/L (ref 1–33)
ANION GAP SERPL CALCULATED.3IONS-SCNC: 13 MMOL/L (ref 5–15)
AST SERPL-CCNC: 15 U/L (ref 1–32)
BASOPHILS # BLD AUTO: 0.04 10*3/MM3 (ref 0–0.2)
BASOPHILS NFR BLD AUTO: 0.6 % (ref 0–1.5)
BILIRUB SERPL-MCNC: 1.1 MG/DL (ref 0–1.2)
BUN SERPL-MCNC: 10 MG/DL (ref 6–20)
BUN/CREAT SERPL: 16.1 (ref 7–25)
CALCIUM SPEC-SCNC: 9.3 MG/DL (ref 8.6–10.5)
CHLORIDE SERPL-SCNC: 102 MMOL/L (ref 98–107)
CO2 SERPL-SCNC: 24 MMOL/L (ref 22–29)
CREAT SERPL-MCNC: 0.62 MG/DL (ref 0.57–1)
DEPRECATED RDW RBC AUTO: 39.3 FL (ref 37–54)
EOSINOPHIL # BLD AUTO: 0.12 10*3/MM3 (ref 0–0.4)
EOSINOPHIL NFR BLD AUTO: 1.9 % (ref 0.3–6.2)
ERYTHROCYTE [DISTWIDTH] IN BLOOD BY AUTOMATED COUNT: 12.1 % (ref 12.3–15.4)
GFR SERPL CREATININE-BSD FRML MDRD: 114 ML/MIN/1.73
GLOBULIN UR ELPH-MCNC: 3.1 GM/DL
GLUCOSE SERPL-MCNC: 88 MG/DL (ref 65–99)
HCT VFR BLD AUTO: 38.3 % (ref 34–46.6)
HGB BLD-MCNC: 12.7 G/DL (ref 12–15.9)
HOLD SPECIMEN: NORMAL
HOLD SPECIMEN: NORMAL
IMM GRANULOCYTES # BLD AUTO: 0.01 10*3/MM3 (ref 0–0.05)
IMM GRANULOCYTES NFR BLD AUTO: 0.2 % (ref 0–0.5)
LIPASE SERPL-CCNC: 29 U/L (ref 13–60)
LYMPHOCYTES # BLD AUTO: 2.55 10*3/MM3 (ref 0.7–3.1)
LYMPHOCYTES NFR BLD AUTO: 39.5 % (ref 19.6–45.3)
MCH RBC QN AUTO: 29.4 PG (ref 26.6–33)
MCHC RBC AUTO-ENTMCNC: 33.2 G/DL (ref 31.5–35.7)
MCV RBC AUTO: 88.7 FL (ref 79–97)
MONOCYTES # BLD AUTO: 0.23 10*3/MM3 (ref 0.1–0.9)
MONOCYTES NFR BLD AUTO: 3.6 % (ref 5–12)
NEUTROPHILS NFR BLD AUTO: 3.5 10*3/MM3 (ref 1.7–7)
NEUTROPHILS NFR BLD AUTO: 54.2 % (ref 42.7–76)
NRBC BLD AUTO-RTO: 0 /100 WBC (ref 0–0.2)
PLATELET # BLD AUTO: 304 10*3/MM3 (ref 140–450)
PMV BLD AUTO: 9.2 FL (ref 6–12)
POTASSIUM SERPL-SCNC: 3.8 MMOL/L (ref 3.5–5.2)
PROT SERPL-MCNC: 7.4 G/DL (ref 6–8.5)
RBC # BLD AUTO: 4.32 10*6/MM3 (ref 3.77–5.28)
SODIUM SERPL-SCNC: 139 MMOL/L (ref 136–145)
WBC # BLD AUTO: 6.45 10*3/MM3 (ref 3.4–10.8)
WHOLE BLOOD HOLD SPECIMEN: NORMAL
WHOLE BLOOD HOLD SPECIMEN: NORMAL

## 2021-01-02 PROCEDURE — 83690 ASSAY OF LIPASE: CPT | Performed by: EMERGENCY MEDICINE

## 2021-01-02 PROCEDURE — 96375 TX/PRO/DX INJ NEW DRUG ADDON: CPT

## 2021-01-02 PROCEDURE — 99283 EMERGENCY DEPT VISIT LOW MDM: CPT

## 2021-01-02 PROCEDURE — 85025 COMPLETE CBC W/AUTO DIFF WBC: CPT | Performed by: EMERGENCY MEDICINE

## 2021-01-02 PROCEDURE — 25010000002 HYDROMORPHONE PER 4 MG: Performed by: EMERGENCY MEDICINE

## 2021-01-02 PROCEDURE — 25010000002 DIPHENHYDRAMINE PER 50 MG: Performed by: EMERGENCY MEDICINE

## 2021-01-02 PROCEDURE — 25010000002 ONDANSETRON PER 1 MG: Performed by: EMERGENCY MEDICINE

## 2021-01-02 PROCEDURE — 96376 TX/PRO/DX INJ SAME DRUG ADON: CPT

## 2021-01-02 PROCEDURE — 99284 EMERGENCY DEPT VISIT MOD MDM: CPT

## 2021-01-02 PROCEDURE — 96374 THER/PROPH/DIAG INJ IV PUSH: CPT

## 2021-01-02 PROCEDURE — 80053 COMPREHEN METABOLIC PANEL: CPT | Performed by: EMERGENCY MEDICINE

## 2021-01-02 PROCEDURE — 25010000002 PROCHLORPERAZINE 10 MG/2ML SOLUTION: Performed by: EMERGENCY MEDICINE

## 2021-01-02 RX ORDER — SODIUM CHLORIDE 9 MG/ML
10 INJECTION INTRAVENOUS AS NEEDED
Status: DISCONTINUED | OUTPATIENT
Start: 2021-01-02 | End: 2021-01-02 | Stop reason: HOSPADM

## 2021-01-02 RX ORDER — ALUMINA, MAGNESIA, AND SIMETHICONE 2400; 2400; 240 MG/30ML; MG/30ML; MG/30ML
10 SUSPENSION ORAL ONCE
Status: COMPLETED | OUTPATIENT
Start: 2021-01-02 | End: 2021-01-02

## 2021-01-02 RX ORDER — HYDROMORPHONE HYDROCHLORIDE 1 MG/ML
0.5 INJECTION, SOLUTION INTRAMUSCULAR; INTRAVENOUS; SUBCUTANEOUS ONCE
Status: COMPLETED | OUTPATIENT
Start: 2021-01-02 | End: 2021-01-02

## 2021-01-02 RX ORDER — PROCHLORPERAZINE EDISYLATE 5 MG/ML
5 INJECTION INTRAMUSCULAR; INTRAVENOUS ONCE
Status: COMPLETED | OUTPATIENT
Start: 2021-01-02 | End: 2021-01-02

## 2021-01-02 RX ORDER — ATOMOXETINE 40 MG/1
40 CAPSULE ORAL DAILY
COMMUNITY

## 2021-01-02 RX ORDER — DIPHENHYDRAMINE HYDROCHLORIDE 50 MG/ML
25 INJECTION INTRAMUSCULAR; INTRAVENOUS ONCE
Status: COMPLETED | OUTPATIENT
Start: 2021-01-02 | End: 2021-01-02

## 2021-01-02 RX ORDER — PROMETHAZINE HYDROCHLORIDE 25 MG/1
25 TABLET ORAL EVERY 6 HOURS PRN
Qty: 15 TABLET | Refills: 0 | OUTPATIENT
Start: 2021-01-02 | End: 2021-01-14

## 2021-01-02 RX ORDER — ONDANSETRON 2 MG/ML
4 INJECTION INTRAMUSCULAR; INTRAVENOUS ONCE
Status: COMPLETED | OUTPATIENT
Start: 2021-01-02 | End: 2021-01-02

## 2021-01-02 RX ORDER — PANTOPRAZOLE SODIUM 40 MG/1
40 TABLET, DELAYED RELEASE ORAL 2 TIMES DAILY
Qty: 60 TABLET | Refills: 0 | OUTPATIENT
Start: 2021-01-02 | End: 2021-01-23

## 2021-01-02 RX ORDER — MAGNESIUM HYDROXIDE/ALUMINUM HYDROXICE/SIMETHICONE 120; 1200; 1200 MG/30ML; MG/30ML; MG/30ML
20 SUSPENSION ORAL ONCE
Status: DISCONTINUED | OUTPATIENT
Start: 2021-01-02 | End: 2021-01-02

## 2021-01-02 RX ADMIN — SODIUM CHLORIDE 1000 ML: 9 INJECTION, SOLUTION INTRAVENOUS at 16:16

## 2021-01-02 RX ADMIN — ONDANSETRON 4 MG: 2 INJECTION INTRAMUSCULAR; INTRAVENOUS at 17:33

## 2021-01-02 RX ADMIN — ALUMINUM HYDROXIDE, MAGNESIUM HYDROXIDE, AND DIMETHICONE 10 ML: 400; 400; 40 SUSPENSION ORAL at 17:35

## 2021-01-02 RX ADMIN — HYDROMORPHONE HYDROCHLORIDE 0.5 MG: 1 INJECTION, SOLUTION INTRAMUSCULAR; INTRAVENOUS; SUBCUTANEOUS at 16:19

## 2021-01-02 RX ADMIN — PROCHLORPERAZINE EDISYLATE 5 MG: 5 INJECTION INTRAMUSCULAR; INTRAVENOUS at 16:17

## 2021-01-02 RX ADMIN — HYDROMORPHONE HYDROCHLORIDE 0.5 MG: 1 INJECTION, SOLUTION INTRAMUSCULAR; INTRAVENOUS; SUBCUTANEOUS at 17:02

## 2021-01-02 RX ADMIN — DIPHENHYDRAMINE HYDROCHLORIDE 25 MG: 50 INJECTION INTRAMUSCULAR; INTRAVENOUS at 16:20

## 2021-01-02 NOTE — DISCHARGE INSTRUCTIONS
Double your pantoprazole to 40 mg twice daily.  Use Mylanta or similar liquid antacid up to 1 tbsp every two hours for ulcer related pain, though frequent use may cause diarrhea, less likely constipation.  Contact your gastroenterologist after the weekend to inform her of this episode of pain and vomiting blood.

## 2021-01-02 NOTE — ED PROVIDER NOTES
Subjective   Ms. Mccarthy is a 29 yo female here with vomiting and upper abdominal pain. She became ill a couple days ago, first with nausea and vomiting but upper abdominal pain started soon afterwards and is much worse today. Today she is vomiting coffee grounds appearing emesis. No melena. She gives a history of pancreatitis that felt like this, ulcer, gastric sleeve. She has had an ERCP with stent placement, is on pantoprazole, and has no gallbladder. Phenergan last night allowed her to sleep. Zofran today hasn't helped. The pain does not radiate. She was here with similar pain and vomiting (but no coffee grounds) just recently and had benign labs and CT.  Past and social histories reviewed.      History provided by:  Medical records and patient      Review of Systems   Constitutional: Negative.    HENT: Negative.    Respiratory: Negative.  Negative for shortness of breath.    Cardiovascular: Negative.  Negative for chest pain.   Gastrointestinal: Positive for abdominal pain, nausea and vomiting.   Genitourinary: Negative for difficulty urinating.   Musculoskeletal: Negative for back pain.   Neurological: Negative.    Psychiatric/Behavioral: Negative.    All other systems reviewed and are negative.      Past Medical History:   Diagnosis Date   • Anxiety    • Asthma    • Dysuria    • Glossitis    •  0    • Sore throat    • Strep pharyngitis        Allergies   Allergen Reactions   • Nsaids Hives       Past Surgical History:   Procedure Laterality Date   • CHOLECYSTECTOMY     • GASTRIC SLEEVE LAPAROSCOPIC         History reviewed. No pertinent family history.    Social History     Socioeconomic History   • Marital status: Single     Spouse name: Not on file   • Number of children: Not on file   • Years of education: Not on file   • Highest education level: Not on file   Tobacco Use   • Smoking status: Current Every Day Smoker   • Tobacco comment: 1-5 A DAY   Substance and Sexual Activity   • Alcohol use:  Not Currently           Objective   Physical Exam  Vitals signs and nursing note reviewed.   Constitutional:       General: She is in acute distress.      Appearance: She is well-developed.      Comments: She appears miserable, moaning some, prefers fetal position.   HENT:      Head: Atraumatic.      Mouth/Throat:      Mouth: Mucous membranes are moist.      Comments: Airway patent  Eyes:      General: No scleral icterus.     Conjunctiva/sclera: Conjunctivae normal.   Neck:      Musculoskeletal: Neck supple.      Trachea: Phonation normal.   Cardiovascular:      Rate and Rhythm: Normal rate and regular rhythm.      Heart sounds: Normal heart sounds.   Pulmonary:      Effort: Pulmonary effort is normal. No respiratory distress.      Breath sounds: Normal breath sounds.   Abdominal:      Palpations: Abdomen is soft.      Tenderness: There is no guarding.      Comments: Tender across the entire upper abdomen, more RUQ than LUQ, with tenderness down the right a little.   Musculoskeletal:      Right lower leg: No edema.      Left lower leg: No edema.   Skin:     General: Skin is warm and dry.   Neurological:      Mental Status: She is alert and oriented to person, place, and time.   Psychiatric:         Behavior: Behavior normal.         Procedures           ED Course  ED Course as of Jan 02 2005   Sat Jan 02, 2021   1722 She is feeling better, did get a little restless with Compazine and is still nauseated. Pain improved; she is relieved that it isn't pancreatitis. Will give a dose of Zofran and try some Mylanta.    [LI]   1800 Her pain is much improved soon after the Mylanta, suggesting that her ulcer is back. Still nauseated. Will double her pantoprazole and have her supplement with Mylanta.    [LI]      ED Course User Index  [LI] Farhad Shukla MD      Recent Results (from the past 24 hour(s))   Comprehensive Metabolic Panel    Collection Time: 01/02/21  4:13 PM    Specimen: Blood   Result Value Ref Range     Glucose 88 65 - 99 mg/dL    BUN 10 6 - 20 mg/dL    Creatinine 0.62 0.57 - 1.00 mg/dL    Sodium 139 136 - 145 mmol/L    Potassium 3.8 3.5 - 5.2 mmol/L    Chloride 102 98 - 107 mmol/L    CO2 24.0 22.0 - 29.0 mmol/L    Calcium 9.3 8.6 - 10.5 mg/dL    Total Protein 7.4 6.0 - 8.5 g/dL    Albumin 4.30 3.50 - 5.20 g/dL    ALT (SGPT) 15 1 - 33 U/L    AST (SGOT) 15 1 - 32 U/L    Alkaline Phosphatase 50 39 - 117 U/L    Total Bilirubin 1.1 0.0 - 1.2 mg/dL    eGFR Non African Amer 114 >60 mL/min/1.73    Globulin 3.1 gm/dL    A/G Ratio 1.4 g/dL    BUN/Creatinine Ratio 16.1 7.0 - 25.0    Anion Gap 13.0 5.0 - 15.0 mmol/L   Lipase    Collection Time: 01/02/21  4:13 PM    Specimen: Blood   Result Value Ref Range    Lipase 29 13 - 60 U/L   Light Blue Top    Collection Time: 01/02/21  4:13 PM   Result Value Ref Range    Extra Tube hold for add-on    Green Top (Gel)    Collection Time: 01/02/21  4:13 PM   Result Value Ref Range    Extra Tube Hold for add-ons.    Lavender Top    Collection Time: 01/02/21  4:13 PM   Result Value Ref Range    Extra Tube hold for add-on    Gold Top - SST    Collection Time: 01/02/21  4:13 PM   Result Value Ref Range    Extra Tube Hold for add-ons.    CBC Auto Differential    Collection Time: 01/02/21  4:13 PM    Specimen: Blood   Result Value Ref Range    WBC 6.45 3.40 - 10.80 10*3/mm3    RBC 4.32 3.77 - 5.28 10*6/mm3    Hemoglobin 12.7 12.0 - 15.9 g/dL    Hematocrit 38.3 34.0 - 46.6 %    MCV 88.7 79.0 - 97.0 fL    MCH 29.4 26.6 - 33.0 pg    MCHC 33.2 31.5 - 35.7 g/dL    RDW 12.1 (L) 12.3 - 15.4 %    RDW-SD 39.3 37.0 - 54.0 fl    MPV 9.2 6.0 - 12.0 fL    Platelets 304 140 - 450 10*3/mm3    Neutrophil % 54.2 42.7 - 76.0 %    Lymphocyte % 39.5 19.6 - 45.3 %    Monocyte % 3.6 (L) 5.0 - 12.0 %    Eosinophil % 1.9 0.3 - 6.2 %    Basophil % 0.6 0.0 - 1.5 %    Immature Grans % 0.2 0.0 - 0.5 %    Neutrophils, Absolute 3.50 1.70 - 7.00 10*3/mm3    Lymphocytes, Absolute 2.55 0.70 - 3.10 10*3/mm3    Monocytes,  "Absolute 0.23 0.10 - 0.90 10*3/mm3    Eosinophils, Absolute 0.12 0.00 - 0.40 10*3/mm3    Basophils, Absolute 0.04 0.00 - 0.20 10*3/mm3    Immature Grans, Absolute 0.01 0.00 - 0.05 10*3/mm3    nRBC 0.0 0.0 - 0.2 /100 WBC     Note: In addition to lab results from this visit, the labs listed above may include labs taken at another facility or during a different encounter within the last 24 hours. Please correlate lab times with ED admission and discharge times for further clarification of the services performed during this visit.    No orders to display     Vitals:    01/02/21 1522 01/02/21 1600 01/02/21 1630 01/02/21 1735   BP: 134/86 127/87 106/69 115/73   BP Location: Left arm      Patient Position: Sitting      Pulse: 85 74 77 71   Resp: 20      Temp: 98.2 °F (36.8 °C)      TempSrc: Infrared      SpO2: 99% 98% 98% 97%   Weight: 68 kg (150 lb)      Height: 162.6 cm (64\")        Medications   Sodium Chloride (PF) 0.9 % 10 mL (has no administration in time range)   sodium chloride 0.9 % bolus 1,000 mL (0 mL Intravenous Stopped 1/2/21 1700)   diphenhydrAMINE (BENADRYL) injection 25 mg (25 mg Intravenous Given 1/2/21 1620)   prochlorperazine (COMPAZINE) injection 5 mg (5 mg Intravenous Given 1/2/21 1617)   HYDROmorphone (DILAUDID) injection 0.5 mg (0.5 mg Intravenous Given 1/2/21 1619)   HYDROmorphone (DILAUDID) injection 0.5 mg (0.5 mg Intravenous Given 1/2/21 1702)   ondansetron (ZOFRAN) injection 4 mg (4 mg Intravenous Given 1/2/21 1733)   aluminum-magnesium hydroxide-simethicone (MAALOX MAX) 400-400-40 MG/5ML suspension 10 mL (10 mL Oral Given 1/2/21 1735)     ECG/EMG Results (last 24 hours)     ** No results found for the last 24 hours. **        No orders to display                                            MDM    Final diagnoses:   Epigastric pain   Non-intractable vomiting with nausea, unspecified vomiting type   Peptic ulcer disease   Hematemesis with nausea            Farhad Shukla MD  01/02/21 2005    "

## 2021-01-14 ENCOUNTER — APPOINTMENT (OUTPATIENT)
Dept: CT IMAGING | Facility: HOSPITAL | Age: 31
End: 2021-01-14

## 2021-01-14 ENCOUNTER — HOSPITAL ENCOUNTER (EMERGENCY)
Facility: HOSPITAL | Age: 31
Discharge: HOME | End: 2021-01-14
Attending: EMERGENCY MEDICINE | Admitting: EMERGENCY MEDICINE

## 2021-01-14 VITALS
SYSTOLIC BLOOD PRESSURE: 115 MMHG | HEIGHT: 64 IN | WEIGHT: 155 LBS | OXYGEN SATURATION: 88 % | TEMPERATURE: 98 F | HEART RATE: 96 BPM | RESPIRATION RATE: 22 BRPM | BODY MASS INDEX: 26.46 KG/M2 | DIASTOLIC BLOOD PRESSURE: 86 MMHG

## 2021-01-14 DIAGNOSIS — K52.9 COLITIS: ICD-10-CM

## 2021-01-14 DIAGNOSIS — E87.6 HYPOKALEMIA: ICD-10-CM

## 2021-01-14 DIAGNOSIS — R10.31 RIGHT LOWER QUADRANT ABDOMINAL PAIN: Primary | ICD-10-CM

## 2021-01-14 LAB
ALBUMIN SERPL-MCNC: 4.8 G/DL (ref 3.5–5.2)
ALBUMIN/GLOB SERPL: 1.5 G/DL
ALP SERPL-CCNC: 46 U/L (ref 39–117)
ALT SERPL W P-5'-P-CCNC: 11 U/L (ref 1–33)
ANION GAP SERPL CALCULATED.3IONS-SCNC: 11 MMOL/L (ref 5–15)
AST SERPL-CCNC: 14 U/L (ref 1–32)
BACTERIA UR QL AUTO: ABNORMAL /HPF
BASOPHILS # BLD AUTO: 0.05 10*3/MM3 (ref 0–0.2)
BASOPHILS NFR BLD AUTO: 0.6 % (ref 0–1.5)
BILIRUB SERPL-MCNC: 0.8 MG/DL (ref 0–1.2)
BILIRUB UR QL STRIP: NEGATIVE
BUN SERPL-MCNC: 11 MG/DL (ref 6–20)
BUN/CREAT SERPL: 19.3 (ref 7–25)
CALCIUM SPEC-SCNC: 9.3 MG/DL (ref 8.6–10.5)
CHLORIDE SERPL-SCNC: 103 MMOL/L (ref 98–107)
CLARITY UR: ABNORMAL
CO2 SERPL-SCNC: 24 MMOL/L (ref 22–29)
COLOR UR: YELLOW
CREAT SERPL-MCNC: 0.57 MG/DL (ref 0.57–1)
D-LACTATE SERPL-SCNC: 0.9 MMOL/L (ref 0.5–2)
DEPRECATED RDW RBC AUTO: 39.2 FL (ref 37–54)
EOSINOPHIL # BLD AUTO: 0.13 10*3/MM3 (ref 0–0.4)
EOSINOPHIL NFR BLD AUTO: 1.6 % (ref 0.3–6.2)
ERYTHROCYTE [DISTWIDTH] IN BLOOD BY AUTOMATED COUNT: 12.1 % (ref 12.3–15.4)
GFR SERPL CREATININE-BSD FRML MDRD: 125 ML/MIN/1.73
GLOBULIN UR ELPH-MCNC: 3.2 GM/DL
GLUCOSE SERPL-MCNC: 83 MG/DL (ref 65–99)
GLUCOSE UR STRIP-MCNC: NEGATIVE MG/DL
HCT VFR BLD AUTO: 40.7 % (ref 34–46.6)
HGB BLD-MCNC: 13.3 G/DL (ref 12–15.9)
HGB UR QL STRIP.AUTO: NEGATIVE
HOLD SPECIMEN: NORMAL
HOLD SPECIMEN: NORMAL
HYALINE CASTS UR QL AUTO: ABNORMAL /LPF
IMM GRANULOCYTES # BLD AUTO: 0.01 10*3/MM3 (ref 0–0.05)
IMM GRANULOCYTES NFR BLD AUTO: 0.1 % (ref 0–0.5)
KETONES UR QL STRIP: NEGATIVE
LEUKOCYTE ESTERASE UR QL STRIP.AUTO: NEGATIVE
LIPASE SERPL-CCNC: 41 U/L (ref 13–60)
LYMPHOCYTES # BLD AUTO: 4.01 10*3/MM3 (ref 0.7–3.1)
LYMPHOCYTES NFR BLD AUTO: 50.4 % (ref 19.6–45.3)
MCH RBC QN AUTO: 28.9 PG (ref 26.6–33)
MCHC RBC AUTO-ENTMCNC: 32.7 G/DL (ref 31.5–35.7)
MCV RBC AUTO: 88.5 FL (ref 79–97)
MONOCYTES # BLD AUTO: 0.35 10*3/MM3 (ref 0.1–0.9)
MONOCYTES NFR BLD AUTO: 4.4 % (ref 5–12)
NEUTROPHILS NFR BLD AUTO: 3.4 10*3/MM3 (ref 1.7–7)
NEUTROPHILS NFR BLD AUTO: 42.9 % (ref 42.7–76)
NITRITE UR QL STRIP: NEGATIVE
NRBC BLD AUTO-RTO: 0 /100 WBC (ref 0–0.2)
PH UR STRIP.AUTO: <=5 [PH] (ref 5–8)
PLATELET # BLD AUTO: 279 10*3/MM3 (ref 140–450)
PMV BLD AUTO: 9.5 FL (ref 6–12)
POTASSIUM SERPL-SCNC: 3.2 MMOL/L (ref 3.5–5.2)
PROT SERPL-MCNC: 8 G/DL (ref 6–8.5)
PROT UR QL STRIP: NEGATIVE
RBC # BLD AUTO: 4.6 10*6/MM3 (ref 3.77–5.28)
RBC # UR: ABNORMAL /HPF
REF LAB TEST METHOD: ABNORMAL
SODIUM SERPL-SCNC: 138 MMOL/L (ref 136–145)
SP GR UR STRIP: 1.09 (ref 1–1.03)
SQUAMOUS #/AREA URNS HPF: ABNORMAL /HPF
UROBILINOGEN UR QL STRIP: ABNORMAL
WBC # BLD AUTO: 7.95 10*3/MM3 (ref 3.4–10.8)
WBC UR QL AUTO: ABNORMAL /HPF
WHOLE BLOOD HOLD SPECIMEN: NORMAL
WHOLE BLOOD HOLD SPECIMEN: NORMAL

## 2021-01-14 PROCEDURE — 80053 COMPREHEN METABOLIC PANEL: CPT | Performed by: EMERGENCY MEDICINE

## 2021-01-14 PROCEDURE — 96375 TX/PRO/DX INJ NEW DRUG ADDON: CPT

## 2021-01-14 PROCEDURE — 99283 EMERGENCY DEPT VISIT LOW MDM: CPT

## 2021-01-14 PROCEDURE — 25010000002 HYDROMORPHONE 1 MG/ML SOLUTION: Performed by: EMERGENCY MEDICINE

## 2021-01-14 PROCEDURE — 81001 URINALYSIS AUTO W/SCOPE: CPT | Performed by: EMERGENCY MEDICINE

## 2021-01-14 PROCEDURE — 83690 ASSAY OF LIPASE: CPT | Performed by: EMERGENCY MEDICINE

## 2021-01-14 PROCEDURE — 74177 CT ABD & PELVIS W/CONTRAST: CPT

## 2021-01-14 PROCEDURE — 96374 THER/PROPH/DIAG INJ IV PUSH: CPT

## 2021-01-14 PROCEDURE — 25010000002 MORPHINE PER 10 MG: Performed by: EMERGENCY MEDICINE

## 2021-01-14 PROCEDURE — 83605 ASSAY OF LACTIC ACID: CPT | Performed by: EMERGENCY MEDICINE

## 2021-01-14 PROCEDURE — 25010000002 IOPAMIDOL 61 % SOLUTION: Performed by: EMERGENCY MEDICINE

## 2021-01-14 PROCEDURE — 99284 EMERGENCY DEPT VISIT MOD MDM: CPT

## 2021-01-14 PROCEDURE — 85025 COMPLETE CBC W/AUTO DIFF WBC: CPT | Performed by: EMERGENCY MEDICINE

## 2021-01-14 PROCEDURE — 63710000001 PROMETHAZINE PER 25 MG: Performed by: EMERGENCY MEDICINE

## 2021-01-14 PROCEDURE — 25010000002 ONDANSETRON PER 1 MG: Performed by: EMERGENCY MEDICINE

## 2021-01-14 PROCEDURE — 96376 TX/PRO/DX INJ SAME DRUG ADON: CPT

## 2021-01-14 RX ORDER — SODIUM CHLORIDE 9 MG/ML
10 INJECTION INTRAVENOUS AS NEEDED
Status: DISCONTINUED | OUTPATIENT
Start: 2021-01-14 | End: 2021-01-14 | Stop reason: HOSPADM

## 2021-01-14 RX ORDER — SODIUM CHLORIDE 0.9 % (FLUSH) 0.9 %
10 SYRINGE (ML) INJECTION AS NEEDED
Status: DISCONTINUED | OUTPATIENT
Start: 2021-01-14 | End: 2021-01-14 | Stop reason: HOSPADM

## 2021-01-14 RX ORDER — ONDANSETRON 2 MG/ML
4 INJECTION INTRAMUSCULAR; INTRAVENOUS ONCE
Status: COMPLETED | OUTPATIENT
Start: 2021-01-14 | End: 2021-01-14

## 2021-01-14 RX ORDER — PROMETHAZINE HYDROCHLORIDE 25 MG/1
25 TABLET ORAL ONCE
Status: COMPLETED | OUTPATIENT
Start: 2021-01-14 | End: 2021-01-14

## 2021-01-14 RX ORDER — METRONIDAZOLE 500 MG/1
500 TABLET ORAL 3 TIMES DAILY
Qty: 21 TABLET | Refills: 0 | Status: SHIPPED | OUTPATIENT
Start: 2021-01-14 | End: 2021-01-23

## 2021-01-14 RX ORDER — CIPROFLOXACIN 500 MG/1
500 TABLET, FILM COATED ORAL 2 TIMES DAILY
Qty: 14 TABLET | Refills: 0 | Status: SHIPPED | OUTPATIENT
Start: 2021-01-14 | End: 2021-01-23

## 2021-01-14 RX ORDER — PROMETHAZINE HYDROCHLORIDE 25 MG/1
25 TABLET ORAL EVERY 6 HOURS PRN
Qty: 12 TABLET | Refills: 0 | Status: SHIPPED | OUTPATIENT
Start: 2021-01-14

## 2021-01-14 RX ORDER — POTASSIUM CHLORIDE 750 MG/1
40 CAPSULE, EXTENDED RELEASE ORAL ONCE
Status: COMPLETED | OUTPATIENT
Start: 2021-01-14 | End: 2021-01-14

## 2021-01-14 RX ORDER — MORPHINE SULFATE 4 MG/ML
4 INJECTION, SOLUTION INTRAMUSCULAR; INTRAVENOUS ONCE
Status: COMPLETED | OUTPATIENT
Start: 2021-01-14 | End: 2021-01-14

## 2021-01-14 RX ORDER — METRONIDAZOLE 500 MG/1
500 TABLET ORAL ONCE
Status: COMPLETED | OUTPATIENT
Start: 2021-01-14 | End: 2021-01-14

## 2021-01-14 RX ADMIN — MORPHINE SULFATE 4 MG: 4 INJECTION, SOLUTION INTRAMUSCULAR; INTRAVENOUS at 04:12

## 2021-01-14 RX ADMIN — PROMETHAZINE HYDROCHLORIDE 25 MG: 25 TABLET ORAL at 06:53

## 2021-01-14 RX ADMIN — IOPAMIDOL 100 ML: 612 INJECTION, SOLUTION INTRAVENOUS at 04:24

## 2021-01-14 RX ADMIN — ONDANSETRON 4 MG: 2 INJECTION INTRAMUSCULAR; INTRAVENOUS at 04:13

## 2021-01-14 RX ADMIN — METRONIDAZOLE 500 MG: 500 TABLET ORAL at 05:54

## 2021-01-14 RX ADMIN — POTASSIUM CHLORIDE 40 MEQ: 10 CAPSULE, COATED, EXTENDED RELEASE ORAL at 06:54

## 2021-01-14 RX ADMIN — ONDANSETRON 4 MG: 2 INJECTION INTRAMUSCULAR; INTRAVENOUS at 05:29

## 2021-01-14 RX ADMIN — MORPHINE SULFATE 4 MG: 4 INJECTION, SOLUTION INTRAMUSCULAR; INTRAVENOUS at 04:49

## 2021-01-14 RX ADMIN — SODIUM CHLORIDE 1000 ML: 9 INJECTION, SOLUTION INTRAVENOUS at 04:12

## 2021-01-14 RX ADMIN — HYDROMORPHONE HYDROCHLORIDE 1 MG: 1 INJECTION, SOLUTION INTRAMUSCULAR; INTRAVENOUS; SUBCUTANEOUS at 05:54

## 2021-01-14 NOTE — ED PROVIDER NOTES
Subjective   Maria Ines Mccarthy is a 30 yr old female that presents emergency department for complaints of right lower quadrant pain.  Patient reports that her pain started 4 hours ago.  She describes it as a sharp stabbing pain.  Pain increases with movement and palpation.  She complains of nausea, vomiting.  Patient denies any diarrhea.  Negative for urinary frequency, burning, urgency.  She denies fevers or chills.  Negative for chest pain or shortness of breath.      History provided by:  Patient   used: No    Abdominal Pain  Pain location:  RLQ  Pain quality: sharp and stabbing    Pain severity:  Moderate  Duration:  4 hours  Timing:  Constant  Progression:  Worsening  Relieved by:  Nothing  Worsened by:  Movement and palpation  Associated symptoms: nausea and vomiting    Associated symptoms: no chest pain, no chills, no cough, no diarrhea, no dysuria, no fever and no shortness of breath        Review of Systems   Constitutional: Negative for chills and fever.   Respiratory: Negative for cough and shortness of breath.    Cardiovascular: Negative for chest pain.   Gastrointestinal: Positive for abdominal pain, nausea and vomiting. Negative for diarrhea.   Genitourinary: Negative for dysuria, frequency and urgency.   Neurological: Negative for dizziness and weakness.   All other systems reviewed and are negative.      Past Medical History:   Diagnosis Date   • Anxiety    • Asthma    • Dysuria    • Glossitis    •  0    • Sore throat    • Strep pharyngitis        Allergies   Allergen Reactions   • Nsaids Hives       Past Surgical History:   Procedure Laterality Date   • CHOLECYSTECTOMY     • GASTRIC SLEEVE LAPAROSCOPIC         No family history on file.    Social History     Socioeconomic History   • Marital status: Single     Spouse name: Not on file   • Number of children: Not on file   • Years of education: Not on file   • Highest education level: Not on file   Tobacco Use   • Smoking  status: Current Every Day Smoker   • Tobacco comment: 1-5 A DAY   Substance and Sexual Activity   • Alcohol use: Not Currently           Objective   Physical Exam  Vitals signs and nursing note reviewed.   Constitutional:       Appearance: Normal appearance. She is well-developed. She is not toxic-appearing.   HENT:      Head: Normocephalic and atraumatic.      Mouth/Throat:      Mouth: Mucous membranes are moist.   Eyes:      General: Lids are normal.      Extraocular Movements: Extraocular movements intact.      Conjunctiva/sclera: Conjunctivae normal.      Pupils: Pupils are equal, round, and reactive to light.   Neck:      Musculoskeletal: Full passive range of motion without pain and normal range of motion.      Trachea: Trachea normal.   Cardiovascular:      Rate and Rhythm: Normal rate and regular rhythm.      Pulses: Normal pulses.      Heart sounds: Normal heart sounds.   Pulmonary:      Effort: Pulmonary effort is normal. No respiratory distress.      Breath sounds: Normal breath sounds. No decreased breath sounds, wheezing, rhonchi or rales.   Abdominal:      General: Bowel sounds are normal.      Palpations: Abdomen is soft.      Tenderness: There is abdominal tenderness in the right lower quadrant.   Musculoskeletal: Normal range of motion.   Skin:     General: Skin is warm and dry.      Findings: No rash.   Neurological:      Mental Status: She is alert and oriented to person, place, and time.      Cranial Nerves: No cranial nerve deficit.   Psychiatric:         Speech: Speech normal.         Behavior: Behavior normal. Behavior is cooperative.         Procedures           ED Course  ED Course as of Jan 14 0709   Thu Jan 14, 2021   0655 Results are discussed with patient at this time.  Patient will be discharged home.  Patient to take meds as ordered.  Patient to follow-up with PCP.  Patient agrees and verbalized understanding.    [KG]      ED Course User Index  [KG] Kaylee Gonzalez, APRN                 Recent Results (from the past 24 hour(s))   Comprehensive Metabolic Panel    Collection Time: 01/14/21  3:15 AM    Specimen: Blood   Result Value Ref Range    Glucose 83 65 - 99 mg/dL    BUN 11 6 - 20 mg/dL    Creatinine 0.57 0.57 - 1.00 mg/dL    Sodium 138 136 - 145 mmol/L    Potassium 3.2 (L) 3.5 - 5.2 mmol/L    Chloride 103 98 - 107 mmol/L    CO2 24.0 22.0 - 29.0 mmol/L    Calcium 9.3 8.6 - 10.5 mg/dL    Total Protein 8.0 6.0 - 8.5 g/dL    Albumin 4.80 3.50 - 5.20 g/dL    ALT (SGPT) 11 1 - 33 U/L    AST (SGOT) 14 1 - 32 U/L    Alkaline Phosphatase 46 39 - 117 U/L    Total Bilirubin 0.8 0.0 - 1.2 mg/dL    eGFR Non African Amer 125 >60 mL/min/1.73    Globulin 3.2 gm/dL    A/G Ratio 1.5 g/dL    BUN/Creatinine Ratio 19.3 7.0 - 25.0    Anion Gap 11.0 5.0 - 15.0 mmol/L   Lipase    Collection Time: 01/14/21  3:15 AM    Specimen: Blood   Result Value Ref Range    Lipase 41 13 - 60 U/L   Light Blue Top    Collection Time: 01/14/21  3:15 AM   Result Value Ref Range    Extra Tube hold for add-on    Green Top (Gel)    Collection Time: 01/14/21  3:15 AM   Result Value Ref Range    Extra Tube Hold for add-ons.    Lavender Top    Collection Time: 01/14/21  3:15 AM   Result Value Ref Range    Extra Tube hold for add-on    Gold Top - SST    Collection Time: 01/14/21  3:15 AM   Result Value Ref Range    Extra Tube Hold for add-ons.    CBC Auto Differential    Collection Time: 01/14/21  3:15 AM    Specimen: Blood   Result Value Ref Range    WBC 7.95 3.40 - 10.80 10*3/mm3    RBC 4.60 3.77 - 5.28 10*6/mm3    Hemoglobin 13.3 12.0 - 15.9 g/dL    Hematocrit 40.7 34.0 - 46.6 %    MCV 88.5 79.0 - 97.0 fL    MCH 28.9 26.6 - 33.0 pg    MCHC 32.7 31.5 - 35.7 g/dL    RDW 12.1 (L) 12.3 - 15.4 %    RDW-SD 39.2 37.0 - 54.0 fl    MPV 9.5 6.0 - 12.0 fL    Platelets 279 140 - 450 10*3/mm3    Neutrophil % 42.9 42.7 - 76.0 %    Lymphocyte % 50.4 (H) 19.6 - 45.3 %    Monocyte % 4.4 (L) 5.0 - 12.0 %    Eosinophil % 1.6 0.3 - 6.2 %    Basophil %  0.6 0.0 - 1.5 %    Immature Grans % 0.1 0.0 - 0.5 %    Neutrophils, Absolute 3.40 1.70 - 7.00 10*3/mm3    Lymphocytes, Absolute 4.01 (H) 0.70 - 3.10 10*3/mm3    Monocytes, Absolute 0.35 0.10 - 0.90 10*3/mm3    Eosinophils, Absolute 0.13 0.00 - 0.40 10*3/mm3    Basophils, Absolute 0.05 0.00 - 0.20 10*3/mm3    Immature Grans, Absolute 0.01 0.00 - 0.05 10*3/mm3    nRBC 0.0 0.0 - 0.2 /100 WBC   Lactic Acid, Plasma    Collection Time: 01/14/21  3:16 AM    Specimen: Blood   Result Value Ref Range    Lactate 0.9 0.5 - 2.0 mmol/L   Urinalysis With Microscopic If Indicated (No Culture) - Urine, Clean Catch    Collection Time: 01/14/21  6:38 AM    Specimen: Urine, Clean Catch   Result Value Ref Range    Color, UA Yellow Yellow, Straw    Appearance, UA Cloudy (A) Clear    pH, UA <=5.0 5.0 - 8.0    Specific Gravity, UA 1.095 (H) 1.001 - 1.030    Glucose, UA Negative Negative    Ketones, UA Negative Negative    Bilirubin, UA Negative Negative    Blood, UA Negative Negative    Protein, UA Negative Negative    Leuk Esterase, UA Negative Negative    Nitrite, UA Negative Negative    Urobilinogen, UA 0.2 E.U./dL 0.2 - 1.0 E.U./dL     Note: In addition to lab results from this visit, the labs listed above may include labs taken at another facility or during a different encounter within the last 24 hours. Please correlate lab times with ED admission and discharge times for further clarification of the services performed during this visit.    CT Abdomen Pelvis With Contrast   Final Result      1. Mild rectosigmoid colitis with findings suggestive of diarrhea in the remainder of the colon. The appendix is normal.   2. Gas-filled small bowel loops suggesting ileus or gastroenteritis.   3. 1.8 cm left ovarian cyst.   4. Cholecystectomy without biliary obstruction.   5. Gastric sleeve and IUD placement.               Signer Name: Sundeep Dunaway MD    Signed: 1/14/2021 4:55 AM    Workstation Name: [email or web address]     [facility]        Vitals:    01/14/21 0415 01/14/21 0416 01/14/21 0420 01/14/21 0500   BP: 114/74   115/86   BP Location:       Patient Position:       Pulse:   96    Resp:       Temp:       TempSrc:       SpO2: 94% 93%  (!) 88%   Weight:       Height:         Medications   Sodium Chloride (PF) 0.9 % 10 mL (has no administration in time range)   sodium chloride 0.9 % flush 10 mL (has no administration in time range)   sodium chloride 0.9 % bolus 1,000 mL (0 mL Intravenous Stopped 1/14/21 0442)   Morphine sulfate (PF) injection 4 mg (4 mg Intravenous Given 1/14/21 0412)   ondansetron (ZOFRAN) injection 4 mg (4 mg Intravenous Given 1/14/21 0413)   iopamidol (ISOVUE-300) 61 % injection 100 mL (100 mL Intravenous Given 1/14/21 0424)   Morphine sulfate (PF) injection 4 mg (4 mg Intravenous Given 1/14/21 0449)   ondansetron (ZOFRAN) injection 4 mg (4 mg Intravenous Given 1/14/21 0529)   metroNIDAZOLE (FLAGYL) tablet 500 mg (500 mg Oral Given 1/14/21 0554)   HYDROmorphone (DILAUDID) injection 1 mg (1 mg Intravenous Given 1/14/21 0554)   promethazine (PHENERGAN) tablet 25 mg (25 mg Oral Given 1/14/21 0653)   potassium chloride (MICRO-K) CR capsule 40 mEq (40 mEq Oral Given 1/14/21 0654)     ECG/EMG Results (last 24 hours)     ** No results found for the last 24 hours. **        No orders to display       DISCHARGE    Patient discharged in stable condition.    Reviewed implications of results, diagnosis, meds, responsibility to follow up, warning signs and symptoms of possible worsening, potential complications and reasons to return to ER.    Patient/Family voiced understanding of above instructions.    Discussed plan for discharge, as there is no emergent indication for admission.  Pt/family is agreeable and understands need for follow up and possible repeat testing.  Pt/family is aware that discharge does not mean that nothing is wrong but that it indicates no emergency is currently present that requires admission and they  must continue care with follow-up as given below or with a physician of their choice.     FOLLOW-UP  Conchis Jacob, APRN  [address]   [facility] [address] [phone]               Medication List      New Prescriptions    ciprofloxacin 500 MG tablet  Commonly known as: CIPRO  Take 1 tablet by mouth 2 (Two) Times a Day.     metroNIDAZOLE 500 MG tablet  Commonly known as: FLAGYL  Take 1 tablet by mouth 3 (Three) Times a Day.           Where to Get Your Medications      These medications were sent to [facility] [address] [place] [address] - [address] - [phone] [facility] [phone]   [address] [facility] [address]    Phone: [phone]   · ciprofloxacin 500 MG tablet  · metroNIDAZOLE 500 MG tablet  · promethazine 25 MG tablet                                  MDM    Final diagnoses:   Right lower quadrant abdominal pain   Colitis   Hypokalemia            Kaylee Gonzalez, APRN  01/21/21 7616

## 2021-01-22 LAB
BASOPHILS # BLD AUTO: 0.06 10*3/MM3 (ref 0–0.2)
BASOPHILS NFR BLD AUTO: 0.7 % (ref 0–1.5)
DEPRECATED RDW RBC AUTO: 40.7 FL (ref 37–54)
EOSINOPHIL # BLD AUTO: 0.13 10*3/MM3 (ref 0–0.4)
EOSINOPHIL NFR BLD AUTO: 1.5 % (ref 0.3–6.2)
ERYTHROCYTE [DISTWIDTH] IN BLOOD BY AUTOMATED COUNT: 12.1 % (ref 12.3–15.4)
HCT VFR BLD AUTO: 40.6 % (ref 34–46.6)
HGB BLD-MCNC: 13.5 G/DL (ref 12–15.9)
IMM GRANULOCYTES # BLD AUTO: 0.01 10*3/MM3 (ref 0–0.05)
IMM GRANULOCYTES NFR BLD AUTO: 0.1 % (ref 0–0.5)
LIPASE SERPL-CCNC: 40 U/L (ref 13–60)
LYMPHOCYTES # BLD AUTO: 3.95 10*3/MM3 (ref 0.7–3.1)
LYMPHOCYTES NFR BLD AUTO: 44.2 % (ref 19.6–45.3)
MCH RBC QN AUTO: 30.4 PG (ref 26.6–33)
MCHC RBC AUTO-ENTMCNC: 33.3 G/DL (ref 31.5–35.7)
MCV RBC AUTO: 91.4 FL (ref 79–97)
MONOCYTES # BLD AUTO: 0.33 10*3/MM3 (ref 0.1–0.9)
MONOCYTES NFR BLD AUTO: 3.7 % (ref 5–12)
NEUTROPHILS NFR BLD AUTO: 4.45 10*3/MM3 (ref 1.7–7)
NEUTROPHILS NFR BLD AUTO: 49.8 % (ref 42.7–76)
NRBC BLD AUTO-RTO: 0 /100 WBC (ref 0–0.2)
PLATELET # BLD AUTO: 307 10*3/MM3 (ref 140–450)
PMV BLD AUTO: 9.3 FL (ref 6–12)
RBC # BLD AUTO: 4.44 10*6/MM3 (ref 3.77–5.28)
WBC # BLD AUTO: 8.93 10*3/MM3 (ref 3.4–10.8)

## 2021-01-22 PROCEDURE — 99283 EMERGENCY DEPT VISIT LOW MDM: CPT

## 2021-01-22 PROCEDURE — 83690 ASSAY OF LIPASE: CPT

## 2021-01-22 PROCEDURE — 81025 URINE PREGNANCY TEST: CPT | Performed by: EMERGENCY MEDICINE

## 2021-01-22 PROCEDURE — 85025 COMPLETE CBC W/AUTO DIFF WBC: CPT

## 2021-01-22 PROCEDURE — 80053 COMPREHEN METABOLIC PANEL: CPT

## 2021-01-22 RX ORDER — SODIUM CHLORIDE 9 MG/ML
10 INJECTION INTRAVENOUS AS NEEDED
Status: DISCONTINUED | OUTPATIENT
Start: 2021-01-22 | End: 2021-01-23 | Stop reason: HOSPADM

## 2021-01-23 ENCOUNTER — HOSPITAL ENCOUNTER (EMERGENCY)
Facility: HOSPITAL | Age: 31
Discharge: HOME | End: 2021-01-23
Attending: EMERGENCY MEDICINE | Admitting: EMERGENCY MEDICINE

## 2021-01-23 ENCOUNTER — APPOINTMENT (OUTPATIENT)
Dept: CT IMAGING | Facility: HOSPITAL | Age: 31
End: 2021-01-23

## 2021-01-23 VITALS
BODY MASS INDEX: 25.61 KG/M2 | RESPIRATION RATE: 16 BRPM | SYSTOLIC BLOOD PRESSURE: 113 MMHG | DIASTOLIC BLOOD PRESSURE: 87 MMHG | HEIGHT: 64 IN | HEART RATE: 80 BPM | TEMPERATURE: 98.8 F | OXYGEN SATURATION: 100 % | WEIGHT: 150 LBS

## 2021-01-23 DIAGNOSIS — Z98.84 HISTORY OF GASTRIC RESTRICTIVE SURGERY: ICD-10-CM

## 2021-01-23 DIAGNOSIS — R11.2 NAUSEA VOMITING AND DIARRHEA: ICD-10-CM

## 2021-01-23 DIAGNOSIS — R10.9 RIGHT SIDED ABDOMINAL PAIN: Primary | ICD-10-CM

## 2021-01-23 DIAGNOSIS — Z87.09 HISTORY OF ASTHMA: ICD-10-CM

## 2021-01-23 DIAGNOSIS — R19.7 NAUSEA VOMITING AND DIARRHEA: ICD-10-CM

## 2021-01-23 DIAGNOSIS — Z86.59 HISTORY OF ANXIETY: ICD-10-CM

## 2021-01-23 LAB
ALBUMIN SERPL-MCNC: 4.4 G/DL (ref 3.5–5.2)
ALBUMIN/GLOB SERPL: 1.3 G/DL
ALP SERPL-CCNC: 49 U/L (ref 39–117)
ALT SERPL W P-5'-P-CCNC: 12 U/L (ref 1–33)
ANION GAP SERPL CALCULATED.3IONS-SCNC: 14 MMOL/L (ref 5–15)
AST SERPL-CCNC: 18 U/L (ref 1–32)
B-HCG UR QL: NEGATIVE
BACTERIA UR QL AUTO: ABNORMAL /HPF
BILIRUB SERPL-MCNC: 1 MG/DL (ref 0–1.2)
BILIRUB UR QL STRIP: NEGATIVE
BUN SERPL-MCNC: 12 MG/DL (ref 6–20)
BUN/CREAT SERPL: 21.4 (ref 7–25)
CALCIUM SPEC-SCNC: 9.2 MG/DL (ref 8.6–10.5)
CHLORIDE SERPL-SCNC: 105 MMOL/L (ref 98–107)
CLARITY UR: ABNORMAL
CO2 SERPL-SCNC: 19 MMOL/L (ref 22–29)
COLOR UR: YELLOW
CREAT SERPL-MCNC: 0.56 MG/DL (ref 0.57–1)
D-LACTATE SERPL-SCNC: 0.8 MMOL/L (ref 0.5–2)
GFR SERPL CREATININE-BSD FRML MDRD: 128 ML/MIN/1.73
GLOBULIN UR ELPH-MCNC: 3.5 GM/DL
GLUCOSE SERPL-MCNC: 88 MG/DL (ref 65–99)
GLUCOSE UR STRIP-MCNC: NEGATIVE MG/DL
HGB UR QL STRIP.AUTO: NEGATIVE
HOLD SPECIMEN: NORMAL
HOLD SPECIMEN: NORMAL
HYALINE CASTS UR QL AUTO: ABNORMAL /LPF
INTERNAL NEGATIVE CONTROL: NEGATIVE
INTERNAL POSITIVE CONTROL: POSITIVE
KETONES UR QL STRIP: ABNORMAL
LEUKOCYTE ESTERASE UR QL STRIP.AUTO: NEGATIVE
Lab: NORMAL
MUCOUS THREADS URNS QL MICRO: ABNORMAL /HPF
NITRITE UR QL STRIP: NEGATIVE
PH UR STRIP.AUTO: <=5 [PH] (ref 5–8)
POTASSIUM SERPL-SCNC: 4.1 MMOL/L (ref 3.5–5.2)
PROT SERPL-MCNC: 7.9 G/DL (ref 6–8.5)
PROT UR QL STRIP: NEGATIVE
RBC # UR: ABNORMAL /HPF
REF LAB TEST METHOD: ABNORMAL
SODIUM SERPL-SCNC: 138 MMOL/L (ref 136–145)
SP GR UR STRIP: >=1.03 (ref 1–1.03)
SQUAMOUS #/AREA URNS HPF: ABNORMAL /HPF
UROBILINOGEN UR QL STRIP: ABNORMAL
WBC UR QL AUTO: ABNORMAL /HPF
WHOLE BLOOD HOLD SPECIMEN: NORMAL
WHOLE BLOOD HOLD SPECIMEN: NORMAL

## 2021-01-23 PROCEDURE — 25010000002 DROPERIDOL PER 5 MG: Performed by: PHYSICIAN ASSISTANT

## 2021-01-23 PROCEDURE — 25010000002 ONDANSETRON PER 1 MG: Performed by: EMERGENCY MEDICINE

## 2021-01-23 PROCEDURE — 74177 CT ABD & PELVIS W/CONTRAST: CPT

## 2021-01-23 PROCEDURE — 83605 ASSAY OF LACTIC ACID: CPT | Performed by: EMERGENCY MEDICINE

## 2021-01-23 PROCEDURE — 96374 THER/PROPH/DIAG INJ IV PUSH: CPT

## 2021-01-23 PROCEDURE — 25010000002 IOPAMIDOL 61 % SOLUTION: Performed by: EMERGENCY MEDICINE

## 2021-01-23 PROCEDURE — 96361 HYDRATE IV INFUSION ADD-ON: CPT

## 2021-01-23 PROCEDURE — 81001 URINALYSIS AUTO W/SCOPE: CPT | Performed by: EMERGENCY MEDICINE

## 2021-01-23 PROCEDURE — 25010000002 MORPHINE PER 10 MG: Performed by: EMERGENCY MEDICINE

## 2021-01-23 PROCEDURE — 96375 TX/PRO/DX INJ NEW DRUG ADDON: CPT

## 2021-01-23 RX ORDER — METOCLOPRAMIDE HYDROCHLORIDE 5 MG/ML
10 INJECTION INTRAMUSCULAR; INTRAVENOUS ONCE
Status: DISCONTINUED | OUTPATIENT
Start: 2021-01-23 | End: 2021-01-23 | Stop reason: HOSPADM

## 2021-01-23 RX ORDER — ONDANSETRON 2 MG/ML
4 INJECTION INTRAMUSCULAR; INTRAVENOUS ONCE
Status: COMPLETED | OUTPATIENT
Start: 2021-01-23 | End: 2021-01-23

## 2021-01-23 RX ORDER — DICYCLOMINE HCL 20 MG
20 TABLET ORAL EVERY 6 HOURS PRN
Qty: 21 TABLET | Refills: 0 | Status: SHIPPED | OUTPATIENT
Start: 2021-01-23

## 2021-01-23 RX ORDER — ONDANSETRON 4 MG/1
4 TABLET, FILM COATED ORAL EVERY 8 HOURS PRN
Qty: 15 TABLET | Refills: 0 | Status: SHIPPED | OUTPATIENT
Start: 2021-01-23

## 2021-01-23 RX ORDER — DROPERIDOL 2.5 MG/ML
2.5 INJECTION, SOLUTION INTRAMUSCULAR; INTRAVENOUS ONCE
Status: COMPLETED | OUTPATIENT
Start: 2021-01-23 | End: 2021-01-23

## 2021-01-23 RX ORDER — MORPHINE SULFATE 4 MG/ML
4 INJECTION, SOLUTION INTRAMUSCULAR; INTRAVENOUS ONCE
Status: COMPLETED | OUTPATIENT
Start: 2021-01-23 | End: 2021-01-23

## 2021-01-23 RX ADMIN — ONDANSETRON 4 MG: 2 INJECTION INTRAMUSCULAR; INTRAVENOUS at 03:02

## 2021-01-23 RX ADMIN — SODIUM CHLORIDE 2000 ML: 9 INJECTION, SOLUTION INTRAVENOUS at 03:02

## 2021-01-23 RX ADMIN — DROPERIDOL 2.5 MG: 2.5 INJECTION, SOLUTION INTRAMUSCULAR; INTRAVENOUS at 04:23

## 2021-01-23 RX ADMIN — MORPHINE SULFATE 4 MG: 4 INJECTION INTRAVENOUS at 03:04

## 2021-01-23 RX ADMIN — IOPAMIDOL 95 ML: 612 INJECTION, SOLUTION INTRAVENOUS at 03:09

## 2021-01-23 NOTE — ED PROVIDER NOTES
Subjective   This is a 30-year-old female that presents to the ER with right-sided abdominal pain that started this morning around 11 AM.  Pain is constant and described as sharp and cramping.  There is radiation to right lower back.  She reports anorexia and nausea with vomiting x6 episodes.  Patient usually has constipation and takes Bisacodyl.  She had a large loose stool earlier this morning.  She denied any blood or mucus.  Patient reported chills but denies any fever.  She denies any dysuria, urgency, or frequency.  She did have decreased urinary output after multiple episodes of vomiting.  She reports dizziness with standing and feels mildly dehydrated, but she denies any near-syncope or syncope.  Previous abdominal surgeries include gastric sleeve in 2018, as well as cholecystectomy.  Patient follows with GI at [facility] and is scheduled for colonoscopy next week.  Patient denies any recent antibiotics.  Patient denies any URI symptoms, cough, congestion, chest pain, or shortness of breath.  Past medical history is significant for anxiety and asthma.      History provided by:  Patient  Abdominal Pain  Pain location:  RLQ  Pain quality: cramping and sharp    Pain radiates to:  Back  Onset quality:  Sudden  Duration:  1 day  Timing:  Constant  Progression:  Worsening  Context: previous surgery (gastric sleeve and cholecystectomy)    Context: not alcohol use    Relieved by:  Nothing  Worsened by:  Movement and position changes (ambulation)  Ineffective treatments:  None tried  Associated symptoms: anorexia, chills, constipation (history of constipation.  Takes Bisacodyl.), diarrhea (one loose stool this morning, good-size.), nausea and vomiting (Emesis x 6 today)    Associated symptoms: no cough, no dysuria, no fever, no hematemesis, no hematochezia and no shortness of breath    Risk factors: multiple surgeries (cholecystectomy and gastric sleeve in 2018 per Dr. Dexter at [facility].)        Review  of Systems   Constitutional: Positive for appetite change and chills. Negative for fever.   HENT: Negative.    Respiratory: Negative.  Negative for cough and shortness of breath.    Cardiovascular: Negative.    Gastrointestinal: Positive for abdominal pain (RLQ, sharp and stabbing, comes in waves.), anorexia, constipation (history of constipation.  Takes Bisacodyl.), diarrhea (one loose stool this morning, good-size.), nausea and vomiting (Emesis x 6 today). Negative for hematemesis and hematochezia.   Genitourinary: Positive for decreased urine volume. Negative for dysuria, flank pain, frequency and urgency.        LMP: 21-21.  IUD in place.   Neurological: Positive for dizziness (dizziness with standing.). Negative for syncope.   All other systems reviewed and are negative.      Past Medical History:   Diagnosis Date   • Anxiety    • Asthma    • Dysuria    • Glossitis    •  0    • Sore throat    • Strep pharyngitis        Allergies   Allergen Reactions   • Nsaids Hives       Past Surgical History:   Procedure Laterality Date   • CHOLECYSTECTOMY     • ERCP     • GASTRIC SLEEVE LAPAROSCOPIC         Family History   Problem Relation Age of Onset   • Hypertension Mother    • Hypertension Father        Social History     Socioeconomic History   • Marital status: Single     Spouse name: Not on file   • Number of children: Not on file   • Years of education: Not on file   • Highest education level: Not on file   Tobacco Use   • Smoking status: Former Smoker     Quit date: 2019     Years since quittin.0   Substance and Sexual Activity   • Alcohol use: Not Currently   • Drug use: Never           Objective   Physical Exam  Vitals signs and nursing note reviewed.   Constitutional:       Appearance: Normal appearance.      Comments: Patient appears uncomfortable secondary to abdominal pain.   HENT:      Head: Normocephalic and atraumatic.      Right Ear: Tympanic membrane normal.      Left Ear: Tympanic  membrane normal.      Nose: Nose normal.      Mouth/Throat:      Mouth: Mucous membranes are moist.      Pharynx: Oropharynx is clear.      Comments: Oral mucous membranes appear moist.  Eyes:      Extraocular Movements: Extraocular movements intact.      Conjunctiva/sclera: Conjunctivae normal.      Pupils: Pupils are equal, round, and reactive to light.   Neck:      Musculoskeletal: Normal range of motion and neck supple.   Cardiovascular:      Rate and Rhythm: Normal rate and regular rhythm.      Pulses: Normal pulses.      Heart sounds: Normal heart sounds.      Comments: No tachycardia  Pulmonary:      Effort: Pulmonary effort is normal.      Breath sounds: Normal breath sounds.      Comments: Lungs are clear to auscultation bilaterally.  Abdominal:      General: Bowel sounds are normal. There is no distension.      Palpations: Abdomen is soft.      Tenderness: There is abdominal tenderness in the right lower quadrant and epigastric area. There is no right CVA tenderness, left CVA tenderness, guarding or rebound. Positive signs include McBurney's sign. Negative signs include Capps's sign and Rovsing's sign.      Comments: Abdomen soft without distention.  Active bowel sounds in all 4 quadrants.  Moderate tenderness in the right lower quadrant and right flank.  Tenderness at McBurney's point.  No left-sided abdominal tenderness.  Moderate tenderness to epigastric region, as well.  Abdominal exam is nonsurgical.   Musculoskeletal: Normal range of motion.   Skin:     General: Skin is warm and dry.   Neurological:      General: No focal deficit present.      Mental Status: She is alert.   Psychiatric:         Mood and Affect: Mood is anxious.      Comments: Anxious secondary to pain.         Procedures           ED Course  ED Course as of Jan 23 0453   Sat Jan 23, 2021   0343 Awaiting CT scan results.  Patient requested more pain meds less than 30 minutes after receiving the first dose.  Offered some Reglan for  recurrent nausea after Zofran was given, but patient refused Reglan.    [FC]   0446 CBC and chemistries were essentially normal.  Urine hCG was negative.  Urinalysis revealed small amount of ketones but no acute infectious process.  Lactic acid is 0.8.    [FC]   0447 CT scan of the abdomen and pelvis with contrast reveals mild ileus pattern in the small bowel with no evidence of acute enteritis.  Predominantly gas-filled but otherwise normal colon.  Appendix is normal as well.  2.2 cm left ovarian cyst.  IUD is in place.  I reevaluated patient and updated her on all results.  Discussed the case with Dr. Rivera, ER attending physician.  Patient requesting something else for pain.  We will give a dose of droperidol 2.5 mg IV.  Patient has close follow-up with GI next week for colonoscopy.  We will prescribe Bentyl and Zofran as directed.  Return to the ER if any worsening symptoms.    [FC]      ED Course User Index  [FC] Lora Medina, DILMA      Recent Results (from the past 24 hour(s))   Comprehensive Metabolic Panel    Collection Time: 01/22/21 11:07 PM    Specimen: Blood   Result Value Ref Range    Glucose 88 65 - 99 mg/dL    BUN 12 6 - 20 mg/dL    Creatinine 0.56 (L) 0.57 - 1.00 mg/dL    Sodium 138 136 - 145 mmol/L    Potassium 4.1 3.5 - 5.2 mmol/L    Chloride 105 98 - 107 mmol/L    CO2 19.0 (L) 22.0 - 29.0 mmol/L    Calcium 9.2 8.6 - 10.5 mg/dL    Total Protein 7.9 6.0 - 8.5 g/dL    Albumin 4.40 3.50 - 5.20 g/dL    ALT (SGPT) 12 1 - 33 U/L    AST (SGOT) 18 1 - 32 U/L    Alkaline Phosphatase 49 39 - 117 U/L    Total Bilirubin 1.0 0.0 - 1.2 mg/dL    eGFR Non African Amer 128 >60 mL/min/1.73    Globulin 3.5 gm/dL    A/G Ratio 1.3 g/dL    BUN/Creatinine Ratio 21.4 7.0 - 25.0    Anion Gap 14.0 5.0 - 15.0 mmol/L   Lipase    Collection Time: 01/22/21 11:07 PM    Specimen: Blood   Result Value Ref Range    Lipase 40 13 - 60 U/L   Light Blue Top    Collection Time: 01/22/21 11:07 PM   Result Value Ref Range    Extra Tube  hold for add-on    Green Top (Gel)    Collection Time: 01/22/21 11:07 PM   Result Value Ref Range    Extra Tube Hold for add-ons.    Lavender Top    Collection Time: 01/22/21 11:07 PM   Result Value Ref Range    Extra Tube hold for add-on    Gold Top - SST    Collection Time: 01/22/21 11:07 PM   Result Value Ref Range    Extra Tube Hold for add-ons.    CBC Auto Differential    Collection Time: 01/22/21 11:07 PM    Specimen: Blood   Result Value Ref Range    WBC 8.93 3.40 - 10.80 10*3/mm3    RBC 4.44 3.77 - 5.28 10*6/mm3    Hemoglobin 13.5 12.0 - 15.9 g/dL    Hematocrit 40.6 34.0 - 46.6 %    MCV 91.4 79.0 - 97.0 fL    MCH 30.4 26.6 - 33.0 pg    MCHC 33.3 31.5 - 35.7 g/dL    RDW 12.1 (L) 12.3 - 15.4 %    RDW-SD 40.7 37.0 - 54.0 fl    MPV 9.3 6.0 - 12.0 fL    Platelets 307 140 - 450 10*3/mm3    Neutrophil % 49.8 42.7 - 76.0 %    Lymphocyte % 44.2 19.6 - 45.3 %    Monocyte % 3.7 (L) 5.0 - 12.0 %    Eosinophil % 1.5 0.3 - 6.2 %    Basophil % 0.7 0.0 - 1.5 %    Immature Grans % 0.1 0.0 - 0.5 %    Neutrophils, Absolute 4.45 1.70 - 7.00 10*3/mm3    Lymphocytes, Absolute 3.95 (H) 0.70 - 3.10 10*3/mm3    Monocytes, Absolute 0.33 0.10 - 0.90 10*3/mm3    Eosinophils, Absolute 0.13 0.00 - 0.40 10*3/mm3    Basophils, Absolute 0.06 0.00 - 0.20 10*3/mm3    Immature Grans, Absolute 0.01 0.00 - 0.05 10*3/mm3    nRBC 0.0 0.0 - 0.2 /100 WBC   Urinalysis With Microscopic If Indicated (No Culture) - Urine, Clean Catch    Collection Time: 01/23/21  2:32 AM    Specimen: Urine, Clean Catch   Result Value Ref Range    Color, UA Yellow Yellow, Straw    Appearance, UA Cloudy (A) Clear    pH, UA <=5.0 5.0 - 8.0    Specific Gravity, UA >=1.030 1.001 - 1.030    Glucose, UA Negative Negative    Ketones, UA 15 mg/dL (1+) (A) Negative    Bilirubin, UA Negative Negative    Blood, UA Negative Negative    Protein, UA Negative Negative    Leuk Esterase, UA Negative Negative    Nitrite, UA Negative Negative    Urobilinogen, UA 0.2 E.U./dL 0.2 - 1.0  E.U./dL   Urinalysis, Microscopic Only - Urine, Clean Catch    Collection Time: 01/23/21  2:32 AM    Specimen: Urine, Clean Catch   Result Value Ref Range    RBC, UA 3-6 (A) None Seen, 0-2 /HPF    WBC, UA 0-2 None Seen, 0-2 /HPF    Bacteria, UA None Seen None Seen, Trace /HPF    Squamous Epithelial Cells, UA 7-12 (A) None Seen, 0-2 /HPF    Hyaline Casts, UA 0-6 0 - 6 /LPF    Mucus, UA Large/3+ (A) None Seen, Trace /HPF    Methodology Manual Light Microscopy    POC Pregnancy, Urine    Collection Time: 01/23/21  2:36 AM    Specimen: Urine   Result Value Ref Range    HCG, Urine, QL Negative Negative    Lot Number [id number]     Internal Positive Control Positive     Internal Negative Control Negative    Lactic Acid, Plasma    Collection Time: 01/23/21  3:27 AM    Specimen: Arm, Right; Blood   Result Value Ref Range    Lactate 0.8 0.5 - 2.0 mmol/L     Note: In addition to lab results from this visit, the labs listed above may include labs taken at another facility or during a different encounter within the last 24 hours. Please correlate lab times with ED admission and discharge times for further clarification of the services performed during this visit.    CT Abdomen Pelvis With Contrast   Final Result      1. Mild ileus pattern in the small bowel with no evidence of acute enteritis.   2. Predominantly gas-filled but otherwise normal colon. The appendix is normal as well.   3. 2.2 cm left ovarian cyst.   4. IUD noted in the uterus.   5. Cholecystectomy without biliary obstruction. Gastric sleeve surgery.               Signer Name: Sundeep Dunaway MD    Signed: 1/23/2021 3:42 AM    Workstation Name: [id number]     [facility]        Vitals:    01/23/21 0226 01/23/21 0325 01/23/21 0330 01/23/21 0400   BP: 132/78 124/57 123/85 119/86   BP Location: Left arm Left arm     Pulse: 92 61     Resp: 20 18     Temp:       TempSrc:       SpO2: 98% 100%  100%   Weight:       Height:         Medications    Sodium Chloride (PF) 0.9 % 10 mL (has no administration in time range)   sodium chloride 0.9 % bolus 2,000 mL (2,000 mL Intravenous New Bag 1/23/21 0302)   metoclopramide (REGLAN) injection 10 mg (10 mg Intravenous Not Given 1/23/21 0351)   Morphine sulfate (PF) injection 4 mg (4 mg Intravenous Given 1/23/21 0304)   ondansetron (ZOFRAN) injection 4 mg (4 mg Intravenous Given 1/23/21 0302)   iopamidol (ISOVUE-300) 61 % injection 100 mL (95 mL Intravenous Given 1/23/21 0309)   droperidol (INAPSINE) injection 2.5 mg (2.5 mg Intravenous Given 1/23/21 0423)     ECG/EMG Results (last 24 hours)     ** No results found for the last 24 hours. **        No orders to display                                              MDM    Final diagnoses:   Right sided abdominal pain   Nausea vomiting and diarrhea   History of gastric restrictive surgery   History of asthma   History of anxiety            Lora Medina PA-C  01/23/21 0322

## 2021-01-23 NOTE — DISCHARGE INSTRUCTIONS
ER evaluation reveals normal CBC, chemistries, and urinalysis.  CT scan abdomen and pelvis with contrast reveals air and fluid-filled loops of small bowel, but no evidence of bowel obstruction or acute colitis.  Appendix was within normal limits.  Rx for Bentyl and Zofran as directed.  Recommend clear liquids for the next 24 to 48 hours and slowly advance to a bland low-fat diet as tolerated.  Recommend GI follow-up next week as scheduled for colonoscopy.  Continue with all other current medical management.  Return to the ER sooner if worsening symptoms.

## 2021-02-09 ENCOUNTER — HOSPITAL ENCOUNTER (EMERGENCY)
Facility: HOSPITAL | Age: 31
Discharge: LEFT BEFORE DISCHARGE | End: 2021-02-09
Attending: EMERGENCY MEDICINE | Admitting: EMERGENCY MEDICINE

## 2021-02-09 ENCOUNTER — APPOINTMENT (OUTPATIENT)
Dept: CT IMAGING | Facility: HOSPITAL | Age: 31
End: 2021-02-09

## 2021-02-09 VITALS
RESPIRATION RATE: 18 BRPM | OXYGEN SATURATION: 99 % | WEIGHT: 150 LBS | HEART RATE: 74 BPM | HEIGHT: 64 IN | BODY MASS INDEX: 25.61 KG/M2 | TEMPERATURE: 98.9 F | DIASTOLIC BLOOD PRESSURE: 86 MMHG | SYSTOLIC BLOOD PRESSURE: 126 MMHG

## 2021-02-09 DIAGNOSIS — R10.9 ACUTE ABDOMINAL PAIN: Primary | ICD-10-CM

## 2021-02-09 DIAGNOSIS — Z98.84 STATUS POST LAPAROSCOPIC SLEEVE GASTRECTOMY: ICD-10-CM

## 2021-02-09 LAB
ALBUMIN SERPL-MCNC: 4.5 G/DL (ref 3.5–5.2)
ALBUMIN/GLOB SERPL: 1.4 G/DL
ALP SERPL-CCNC: 54 U/L (ref 39–117)
ALT SERPL W P-5'-P-CCNC: 11 U/L (ref 1–33)
ANION GAP SERPL CALCULATED.3IONS-SCNC: 10 MMOL/L (ref 5–15)
AST SERPL-CCNC: 17 U/L (ref 1–32)
B-HCG UR QL: NEGATIVE
BASOPHILS # BLD AUTO: 0.05 10*3/MM3 (ref 0–0.2)
BASOPHILS NFR BLD AUTO: 0.8 % (ref 0–1.5)
BILIRUB SERPL-MCNC: 0.5 MG/DL (ref 0–1.2)
BILIRUB UR QL STRIP: NEGATIVE
BUN SERPL-MCNC: 11 MG/DL (ref 6–20)
BUN/CREAT SERPL: 15.5 (ref 7–25)
CALCIUM SPEC-SCNC: 9.3 MG/DL (ref 8.6–10.5)
CHLORIDE SERPL-SCNC: 104 MMOL/L (ref 98–107)
CLARITY UR: CLEAR
CO2 SERPL-SCNC: 25 MMOL/L (ref 22–29)
COLOR UR: YELLOW
CREAT SERPL-MCNC: 0.71 MG/DL (ref 0.57–1)
D-LACTATE SERPL-SCNC: 1.2 MMOL/L (ref 0.5–2)
DEPRECATED RDW RBC AUTO: 40.4 FL (ref 37–54)
EOSINOPHIL # BLD AUTO: 0.17 10*3/MM3 (ref 0–0.4)
EOSINOPHIL NFR BLD AUTO: 2.6 % (ref 0.3–6.2)
ERYTHROCYTE [DISTWIDTH] IN BLOOD BY AUTOMATED COUNT: 12.3 % (ref 12.3–15.4)
GFR SERPL CREATININE-BSD FRML MDRD: 97 ML/MIN/1.73
GLOBULIN UR ELPH-MCNC: 3.2 GM/DL
GLUCOSE SERPL-MCNC: 100 MG/DL (ref 65–99)
GLUCOSE UR STRIP-MCNC: NEGATIVE MG/DL
HCT VFR BLD AUTO: 38.3 % (ref 34–46.6)
HGB BLD-MCNC: 12.8 G/DL (ref 12–15.9)
HGB UR QL STRIP.AUTO: NEGATIVE
HOLD SPECIMEN: NORMAL
IMM GRANULOCYTES # BLD AUTO: 0.02 10*3/MM3 (ref 0–0.05)
IMM GRANULOCYTES NFR BLD AUTO: 0.3 % (ref 0–0.5)
INTERNAL NEGATIVE CONTROL: NEGATIVE
INTERNAL POSITIVE CONTROL: POSITIVE
KETONES UR QL STRIP: NEGATIVE
LEUKOCYTE ESTERASE UR QL STRIP.AUTO: NEGATIVE
LIPASE SERPL-CCNC: 44 U/L (ref 13–60)
LYMPHOCYTES # BLD AUTO: 2.37 10*3/MM3 (ref 0.7–3.1)
LYMPHOCYTES NFR BLD AUTO: 35.6 % (ref 19.6–45.3)
Lab: NORMAL
MCH RBC QN AUTO: 30 PG (ref 26.6–33)
MCHC RBC AUTO-ENTMCNC: 33.4 G/DL (ref 31.5–35.7)
MCV RBC AUTO: 89.9 FL (ref 79–97)
MONOCYTES # BLD AUTO: 0.34 10*3/MM3 (ref 0.1–0.9)
MONOCYTES NFR BLD AUTO: 5.1 % (ref 5–12)
NEUTROPHILS NFR BLD AUTO: 3.7 10*3/MM3 (ref 1.7–7)
NEUTROPHILS NFR BLD AUTO: 55.6 % (ref 42.7–76)
NITRITE UR QL STRIP: NEGATIVE
NRBC BLD AUTO-RTO: 0 /100 WBC (ref 0–0.2)
PH UR STRIP.AUTO: 8.5 [PH] (ref 5–8)
PLATELET # BLD AUTO: 310 10*3/MM3 (ref 140–450)
PMV BLD AUTO: 9.3 FL (ref 6–12)
POTASSIUM SERPL-SCNC: 4.3 MMOL/L (ref 3.5–5.2)
PROT SERPL-MCNC: 7.7 G/DL (ref 6–8.5)
PROT UR QL STRIP: NEGATIVE
RBC # BLD AUTO: 4.26 10*6/MM3 (ref 3.77–5.28)
SODIUM SERPL-SCNC: 139 MMOL/L (ref 136–145)
SP GR UR STRIP: 1.01 (ref 1–1.03)
UROBILINOGEN UR QL STRIP: ABNORMAL
WBC # BLD AUTO: 6.65 10*3/MM3 (ref 3.4–10.8)
WHOLE BLOOD HOLD SPECIMEN: NORMAL
WHOLE BLOOD HOLD SPECIMEN: NORMAL

## 2021-02-09 PROCEDURE — 85025 COMPLETE CBC W/AUTO DIFF WBC: CPT | Performed by: EMERGENCY MEDICINE

## 2021-02-09 PROCEDURE — 81025 URINE PREGNANCY TEST: CPT | Performed by: EMERGENCY MEDICINE

## 2021-02-09 PROCEDURE — 81003 URINALYSIS AUTO W/O SCOPE: CPT | Performed by: EMERGENCY MEDICINE

## 2021-02-09 PROCEDURE — 96374 THER/PROPH/DIAG INJ IV PUSH: CPT

## 2021-02-09 PROCEDURE — 83690 ASSAY OF LIPASE: CPT | Performed by: EMERGENCY MEDICINE

## 2021-02-09 PROCEDURE — 80053 COMPREHEN METABOLIC PANEL: CPT | Performed by: EMERGENCY MEDICINE

## 2021-02-09 PROCEDURE — 25010000002 ONDANSETRON PER 1 MG: Performed by: EMERGENCY MEDICINE

## 2021-02-09 PROCEDURE — 99282 EMERGENCY DEPT VISIT SF MDM: CPT

## 2021-02-09 PROCEDURE — 83605 ASSAY OF LACTIC ACID: CPT | Performed by: EMERGENCY MEDICINE

## 2021-02-09 RX ORDER — SODIUM CHLORIDE 9 MG/ML
10 INJECTION INTRAVENOUS AS NEEDED
Status: DISCONTINUED | OUTPATIENT
Start: 2021-02-09 | End: 2021-02-10 | Stop reason: HOSPADM

## 2021-02-09 RX ORDER — ACETAMINOPHEN 500 MG
1000 TABLET ORAL ONCE
Status: DISCONTINUED | OUTPATIENT
Start: 2021-02-09 | End: 2021-02-10 | Stop reason: HOSPADM

## 2021-02-09 RX ORDER — DROPERIDOL 2.5 MG/ML
2.5 INJECTION, SOLUTION INTRAMUSCULAR; INTRAVENOUS ONCE
Status: DISCONTINUED | OUTPATIENT
Start: 2021-02-09 | End: 2021-02-10 | Stop reason: HOSPADM

## 2021-02-09 RX ORDER — ONDANSETRON 2 MG/ML
4 INJECTION INTRAMUSCULAR; INTRAVENOUS ONCE
Status: COMPLETED | OUTPATIENT
Start: 2021-02-09 | End: 2021-02-09

## 2021-02-09 RX ADMIN — ONDANSETRON 4 MG: 2 INJECTION INTRAMUSCULAR; INTRAVENOUS at 22:59

## 2021-02-10 NOTE — ED PROVIDER NOTES
EMERGENCY DEPARTMENT ENCOUNTER      Pt Name: Maria Ines Mccarthy  MRN: [id number]  YOB: 1990  Date of evaluation: 2021  Provider: Puneet Rivera MD    CHIEF COMPLAINT       Chief Complaint   Patient presents with   • Abdominal Pain         HISTORY OF PRESENT ILLNESS  (Location/Symptom, Timing/Onset, Context/Setting, Quality, Duration, Modifying Factors, Severity.)   Maria Ines Mccarthy is a 30 y.o. female who presents to the emergency department with recurrent left upper quadrant abdominal pain.  Patient has been evaluated in this emergency department 4 times in the past month and a half and is received for CT scans that demonstrate no evidence of abnormality.  This is the same cramping mild left upper quadrant pain with some associated nausea but no vomiting, fever, chills and no modifying factors.  Symptoms have been constant for the past day.      Nursing notes were reviewed.    REVIEW OF SYSTEMS    (2-9 systems for level 4, 10 or more for level 5)   ROS:  General:  No fevers, no chills, no weakness  Cardiovascular:  No chest pain, no palpitations  Respiratory:  No shortness of breath, no cough, no wheezing  Gastrointestinal: Abdominal pain, nausea  Musculoskeletal:  No muscle pain, no joint pain  Skin:  No rash, no easy bruising  Neurologic:  No speech problems, no headache, no extremity numbness, no extremity tingling, no extremity weakness  Psychiatric:  No anxiety  Genitourinary:  No dysuria, no hematuria    Except as noted above the remainder of the review of systems was reviewed and negative.       PAST MEDICAL HISTORY     Past Medical History:   Diagnosis Date   • Anxiety    • Asthma    • Dysuria    • Glossitis    •  0    • Sore throat    • Strep pharyngitis          SURGICAL HISTORY       Past Surgical History:   Procedure Laterality Date   • CHOLECYSTECTOMY     • ERCP     • GASTRIC SLEEVE LAPAROSCOPIC           CURRENT MEDICATIONS     No current facility-administered  "medications for this encounter.     Current Outpatient Medications:   •  atomoxetine (STRATTERA) 40 MG capsule, Take 40 mg by mouth Daily., Disp: , Rfl:   •  dicyclomine (BENTYL) 20 MG tablet, Take 1 tablet by mouth Every 6 (Six) Hours As Needed (abdominal cramping)., Disp: 21 tablet, Rfl: 0  •  Multiple Vitamins-Minerals (MULTIVITAMIN AND MINERALS) liquid liquid, Take  by mouth Daily., Disp: , Rfl:   •  ondansetron (ZOFRAN) 4 MG tablet, Take 1 tablet by mouth Every 8 (Eight) Hours As Needed for Nausea or Vomiting., Disp: 15 tablet, Rfl: 0  •  promethazine (PHENERGAN) 25 MG tablet, Take 1 tablet by mouth Every 6 (Six) Hours As Needed for Nausea or Vomiting., Disp: 12 tablet, Rfl: 0  •  sertraline (ZOLOFT) 50 MG tablet, Take 50 mg by mouth Daily., Disp: , Rfl:     ALLERGIES     Nsaids and Reglan [metoclopramide]    FAMILY HISTORY       Family History   Problem Relation Age of Onset   • Hypertension Mother    • Hypertension Father           SOCIAL HISTORY       Social History     Socioeconomic History   • Marital status: Single     Spouse name: Not on file   • Number of children: Not on file   • Years of education: Not on file   • Highest education level: Not on file   Tobacco Use   • Smoking status: Former Smoker     Quit date: 2019     Years since quittin.1   • Smokeless tobacco: Never Used   Substance and Sexual Activity   • Alcohol use: Not Currently   • Drug use: Never         PHYSICAL EXAM    (up to 7 for level 4, 8 or more for level 5)     Vitals:    21 2139 21 2154 21 2314   BP: 109/65 126/86    BP Location: Left arm     Patient Position: Sitting     Pulse: 83  74   Resp: 16  18   Temp: 98.9 °F (37.2 °C)     TempSrc: Tympanic     SpO2: 99% 99%    Weight: 68 kg (150 lb)     Height: 162.6 cm (64\")         Physical Exam  General: Awake, alert, no acute distress.  HEENT: Conjunctiva normal.  Neck: Trachea midline.  Cardiac: Heart regular rate, rhythm, no murmurs, rubs, or gallops  Lungs: " Lungs are clear to auscultation, there is no wheezing, rhonchi, or rales. There is no use of accessory muscles.  Chest wall: There is no tenderness to palpation over the chest wall or over ribs  Abdomen: Mild left upper quadrant tenderness without any associated distention, rebound, or guarding.  There is no tenderness in the right upper quadrant or lower abdomen.  Musculoskeletal: No deformity.  Neuro: Alert and oriented x 4.  Dermatology: Skin is warm and dry  Psych: Mentation is grossly normal, cognition is grossly normal. Affect is appropriate.      DIAGNOSTIC RESULTS       LABS:    I have reviewed and interpreted all of the currently available lab results from this visit (if applicable):  Results for orders placed or performed during the hospital encounter of 02/09/21   Comprehensive Metabolic Panel    Specimen: Blood   Result Value Ref Range    Glucose 100 (H) 65 - 99 mg/dL    BUN 11 6 - 20 mg/dL    Creatinine 0.71 0.57 - 1.00 mg/dL    Sodium 139 136 - 145 mmol/L    Potassium 4.3 3.5 - 5.2 mmol/L    Chloride 104 98 - 107 mmol/L    CO2 25.0 22.0 - 29.0 mmol/L    Calcium 9.3 8.6 - 10.5 mg/dL    Total Protein 7.7 6.0 - 8.5 g/dL    Albumin 4.50 3.50 - 5.20 g/dL    ALT (SGPT) 11 1 - 33 U/L    AST (SGOT) 17 1 - 32 U/L    Alkaline Phosphatase 54 39 - 117 U/L    Total Bilirubin 0.5 0.0 - 1.2 mg/dL    eGFR Non African Amer 97 >60 mL/min/1.73    Globulin 3.2 gm/dL    A/G Ratio 1.4 g/dL    BUN/Creatinine Ratio 15.5 7.0 - 25.0    Anion Gap 10.0 5.0 - 15.0 mmol/L   Lipase    Specimen: Blood   Result Value Ref Range    Lipase 44 13 - 60 U/L   Urinalysis With Microscopic If Indicated (No Culture) - Urine, Clean Catch    Specimen: Urine, Clean Catch   Result Value Ref Range    Color, UA Yellow Yellow, Straw    Appearance, UA Clear Clear    pH, UA 8.5 (H) 5.0 - 8.0    Specific Gravity, UA 1.015 1.001 - 1.030    Glucose, UA Negative Negative    Ketones, UA Negative Negative    Bilirubin, UA Negative Negative    Blood, UA  Negative Negative    Protein, UA Negative Negative    Leuk Esterase, UA Negative Negative    Nitrite, UA Negative Negative    Urobilinogen, UA 1.0 E.U./dL 0.2 - 1.0 E.U./dL   Lactic Acid, Plasma    Specimen: Blood   Result Value Ref Range    Lactate 1.2 0.5 - 2.0 mmol/L   Gray Top - Ice   Result Value Ref Range    Extra Tube Hold for add-ons.    CBC Auto Differential    Specimen: Blood   Result Value Ref Range    WBC 6.65 3.40 - 10.80 10*3/mm3    RBC 4.26 3.77 - 5.28 10*6/mm3    Hemoglobin 12.8 12.0 - 15.9 g/dL    Hematocrit 38.3 34.0 - 46.6 %    MCV 89.9 79.0 - 97.0 fL    MCH 30.0 26.6 - 33.0 pg    MCHC 33.4 31.5 - 35.7 g/dL    RDW 12.3 12.3 - 15.4 %    RDW-SD 40.4 37.0 - 54.0 fl    MPV 9.3 6.0 - 12.0 fL    Platelets 310 140 - 450 10*3/mm3    Neutrophil % 55.6 42.7 - 76.0 %    Lymphocyte % 35.6 19.6 - 45.3 %    Monocyte % 5.1 5.0 - 12.0 %    Eosinophil % 2.6 0.3 - 6.2 %    Basophil % 0.8 0.0 - 1.5 %    Immature Grans % 0.3 0.0 - 0.5 %    Neutrophils, Absolute 3.70 1.70 - 7.00 10*3/mm3    Lymphocytes, Absolute 2.37 0.70 - 3.10 10*3/mm3    Monocytes, Absolute 0.34 0.10 - 0.90 10*3/mm3    Eosinophils, Absolute 0.17 0.00 - 0.40 10*3/mm3    Basophils, Absolute 0.05 0.00 - 0.20 10*3/mm3    Immature Grans, Absolute 0.02 0.00 - 0.05 10*3/mm3    nRBC 0.0 0.0 - 0.2 /100 WBC   POC Pregnancy, Urine    Specimen: Urine   Result Value Ref Range    HCG, Urine, QL Negative Negative    Lot Number [id number]     Internal Positive Control Positive     Internal Negative Control Negative    Light Blue Top   Result Value Ref Range    Extra Tube hold for add-on    Green Top (Gel)   Result Value Ref Range    Extra Tube Hold for add-ons.    Lavender Top   Result Value Ref Range    Extra Tube hold for add-on    Gold Top - SST   Result Value Ref Range    Extra Tube Hold for add-ons.         All other labs were within normal range or not returned as of this dictation.      EMERGENCY DEPARTMENT COURSE and DIFFERENTIAL DIAGNOSIS/MDM:   Vitals:   "  Vitals:    02/09/21 2139 02/09/21 2154 02/09/21 2314   BP: 109/65 126/86    BP Location: Left arm     Patient Position: Sitting     Pulse: 83  74   Resp: 16  18   Temp: 98.9 °F (37.2 °C)     TempSrc: Tympanic     SpO2: 99% 99%    Weight: 68 kg (150 lb)     Height: 162.6 cm (64\")         ED Course as of Feb 10 0539   Wed Feb 10, 2021   0014 I was just informed by the patients nurse that she unfortunately eloped from the emergency department prior to completing her workup.    [NS]      ED Course User Index  [NS] Puneet Rivera MD       Patient well-appearing with relatively benign exam throughout the duration of her stay in the emergency department.  Her laboratory evaluation was again completely unremarkable and demonstrate no evidence of leukocytosis, anemia, electrolyte derangement, hepatic or pancreatic inflammation, UTI, or pregnancy.  I did place order for CT scan of the abdomen pelvis with IV contrast given the patient surgical history, however she did elect to elope from the emergency department.  I offered to provide her multiple medications for symptomatic relief that I felt was consistent with her degree of symptoms and the chronicity of her symptoms, however she demanded opioids on multiple occasions.  When I informed her that we would not be prescribing opioids today but did continue to offer her other medications for symptomatic relief, she unfortunately eloped from the emergency department prior to completing her work-up.      MEDICATIONS ADMINISTERED IN ED:  Medications   ondansetron (ZOFRAN) injection 4 mg (4 mg Intravenous Given 2/9/21 7905)           FINAL IMPRESSION      1. Acute abdominal pain    2. Status post laparoscopic sleeve gastrectomy          DISPOSITION/PLAN     ED Disposition     ED Disposition Condition Comment    Eloped            PATIENT REFERRED TO:  No follow-up provider specified.    DISCHARGE MEDICATIONS:     Medication List      ASK your doctor about these medications  "   atomoxetine 40 MG capsule  Commonly known as: STRATTERA     dicyclomine 20 MG tablet  Commonly known as: BENTYL  Take 1 tablet by mouth Every 6 (Six) Hours As Needed (abdominal cramping).     multivitamin and minerals liquid liquid     ondansetron 4 MG tablet  Commonly known as: ZOFRAN  Take 1 tablet by mouth Every 8 (Eight) Hours As Needed for Nausea or Vomiting.     promethazine 25 MG tablet  Commonly known as: PHENERGAN  Take 1 tablet by mouth Every 6 (Six) Hours As Needed for Nausea or Vomiting.     sertraline 50 MG tablet  Commonly known as: ZOLOFT                Comment: Please note this report has been produced using speech recognition software.      Puneet Rivera MD  Attending Emergency Physician               Puneet Rivera MD  02/10/21 6169

## 2021-02-21 ENCOUNTER — APPOINTMENT (OUTPATIENT)
Dept: CT IMAGING | Facility: HOSPITAL | Age: 31
End: 2021-02-21

## 2021-02-21 ENCOUNTER — HOSPITAL ENCOUNTER (EMERGENCY)
Facility: HOSPITAL | Age: 31
Discharge: HOME | End: 2021-02-21
Attending: EMERGENCY MEDICINE | Admitting: EMERGENCY MEDICINE

## 2021-02-21 VITALS
DIASTOLIC BLOOD PRESSURE: 62 MMHG | WEIGHT: 150 LBS | OXYGEN SATURATION: 98 % | BODY MASS INDEX: 25.61 KG/M2 | TEMPERATURE: 98 F | HEART RATE: 65 BPM | HEIGHT: 64 IN | RESPIRATION RATE: 18 BRPM | SYSTOLIC BLOOD PRESSURE: 107 MMHG

## 2021-02-21 DIAGNOSIS — R11.2 NAUSEA AND VOMITING, INTRACTABILITY OF VOMITING NOT SPECIFIED, UNSPECIFIED VOMITING TYPE: ICD-10-CM

## 2021-02-21 DIAGNOSIS — R10.31 RIGHT LOWER QUADRANT ABDOMINAL PAIN: Primary | ICD-10-CM

## 2021-02-21 LAB
ALBUMIN SERPL-MCNC: 4.6 G/DL (ref 3.5–5.2)
ALBUMIN/GLOB SERPL: 1.6 G/DL
ALP SERPL-CCNC: 49 U/L (ref 39–117)
ALT SERPL W P-5'-P-CCNC: 14 U/L (ref 1–33)
ANION GAP SERPL CALCULATED.3IONS-SCNC: 10 MMOL/L (ref 5–15)
AST SERPL-CCNC: 17 U/L (ref 1–32)
B-HCG UR QL: NEGATIVE
BACTERIA UR QL AUTO: NORMAL /HPF
BASOPHILS # BLD AUTO: 0.05 10*3/MM3 (ref 0–0.2)
BASOPHILS NFR BLD AUTO: 0.8 % (ref 0–1.5)
BILIRUB SERPL-MCNC: 0.5 MG/DL (ref 0–1.2)
BILIRUB UR QL STRIP: NEGATIVE
BUN SERPL-MCNC: 13 MG/DL (ref 6–20)
BUN/CREAT SERPL: 25.5 (ref 7–25)
CALCIUM SPEC-SCNC: 9.4 MG/DL (ref 8.6–10.5)
CHLORIDE SERPL-SCNC: 104 MMOL/L (ref 98–107)
CLARITY UR: CLEAR
CO2 SERPL-SCNC: 25 MMOL/L (ref 22–29)
COLOR UR: YELLOW
CREAT SERPL-MCNC: 0.51 MG/DL (ref 0.57–1)
D-LACTATE SERPL-SCNC: 0.6 MMOL/L (ref 0.5–2)
DEPRECATED RDW RBC AUTO: 40 FL (ref 37–54)
EOSINOPHIL # BLD AUTO: 0.11 10*3/MM3 (ref 0–0.4)
EOSINOPHIL NFR BLD AUTO: 1.8 % (ref 0.3–6.2)
ERYTHROCYTE [DISTWIDTH] IN BLOOD BY AUTOMATED COUNT: 12.6 % (ref 12.3–15.4)
GFR SERPL CREATININE-BSD FRML MDRD: 143 ML/MIN/1.73
GLOBULIN UR ELPH-MCNC: 2.8 GM/DL
GLUCOSE SERPL-MCNC: 88 MG/DL (ref 65–99)
GLUCOSE UR STRIP-MCNC: NEGATIVE MG/DL
HCT VFR BLD AUTO: 34 % (ref 34–46.6)
HGB BLD-MCNC: 11.6 G/DL (ref 12–15.9)
HGB UR QL STRIP.AUTO: ABNORMAL
HOLD SPECIMEN: NORMAL
HYALINE CASTS UR QL AUTO: NORMAL /LPF
IMM GRANULOCYTES # BLD AUTO: 0.01 10*3/MM3 (ref 0–0.05)
IMM GRANULOCYTES NFR BLD AUTO: 0.2 % (ref 0–0.5)
INTERNAL NEGATIVE CONTROL: NEGATIVE
INTERNAL POSITIVE CONTROL: POSITIVE
KETONES UR QL STRIP: NEGATIVE
LEUKOCYTE ESTERASE UR QL STRIP.AUTO: NEGATIVE
LIPASE SERPL-CCNC: 47 U/L (ref 13–60)
LYMPHOCYTES # BLD AUTO: 2.66 10*3/MM3 (ref 0.7–3.1)
LYMPHOCYTES NFR BLD AUTO: 44.2 % (ref 19.6–45.3)
Lab: NORMAL
MCH RBC QN AUTO: 29.8 PG (ref 26.6–33)
MCHC RBC AUTO-ENTMCNC: 34.1 G/DL (ref 31.5–35.7)
MCV RBC AUTO: 87.4 FL (ref 79–97)
MONOCYTES # BLD AUTO: 0.27 10*3/MM3 (ref 0.1–0.9)
MONOCYTES NFR BLD AUTO: 4.5 % (ref 5–12)
NEUTROPHILS NFR BLD AUTO: 2.92 10*3/MM3 (ref 1.7–7)
NEUTROPHILS NFR BLD AUTO: 48.5 % (ref 42.7–76)
NITRITE UR QL STRIP: NEGATIVE
NRBC BLD AUTO-RTO: 0 /100 WBC (ref 0–0.2)
PH UR STRIP.AUTO: 7 [PH] (ref 5–8)
PLATELET # BLD AUTO: 287 10*3/MM3 (ref 140–450)
PMV BLD AUTO: 9.2 FL (ref 6–12)
POTASSIUM SERPL-SCNC: 3.7 MMOL/L (ref 3.5–5.2)
PROT SERPL-MCNC: 7.4 G/DL (ref 6–8.5)
PROT UR QL STRIP: NEGATIVE
RBC # BLD AUTO: 3.89 10*6/MM3 (ref 3.77–5.28)
RBC # UR: NORMAL /HPF
REF LAB TEST METHOD: NORMAL
SODIUM SERPL-SCNC: 139 MMOL/L (ref 136–145)
SP GR UR STRIP: 1.01 (ref 1–1.03)
SQUAMOUS #/AREA URNS HPF: NORMAL /HPF
UROBILINOGEN UR QL STRIP: ABNORMAL
WBC # BLD AUTO: 6.02 10*3/MM3 (ref 3.4–10.8)
WBC UR QL AUTO: NORMAL /HPF
WHOLE BLOOD HOLD SPECIMEN: NORMAL
WHOLE BLOOD HOLD SPECIMEN: NORMAL

## 2021-02-21 PROCEDURE — 25010000002 MORPHINE PER 10 MG: Performed by: EMERGENCY MEDICINE

## 2021-02-21 PROCEDURE — 81001 URINALYSIS AUTO W/SCOPE: CPT | Performed by: EMERGENCY MEDICINE

## 2021-02-21 PROCEDURE — 83605 ASSAY OF LACTIC ACID: CPT | Performed by: PHYSICIAN ASSISTANT

## 2021-02-21 PROCEDURE — 81025 URINE PREGNANCY TEST: CPT | Performed by: EMERGENCY MEDICINE

## 2021-02-21 PROCEDURE — 25010000002 ONDANSETRON PER 1 MG: Performed by: PHYSICIAN ASSISTANT

## 2021-02-21 PROCEDURE — 96374 THER/PROPH/DIAG INJ IV PUSH: CPT

## 2021-02-21 PROCEDURE — 99283 EMERGENCY DEPT VISIT LOW MDM: CPT

## 2021-02-21 PROCEDURE — 96375 TX/PRO/DX INJ NEW DRUG ADDON: CPT

## 2021-02-21 PROCEDURE — 25010000002 IOPAMIDOL 61 % SOLUTION: Performed by: EMERGENCY MEDICINE

## 2021-02-21 PROCEDURE — 85025 COMPLETE CBC W/AUTO DIFF WBC: CPT

## 2021-02-21 PROCEDURE — 80053 COMPREHEN METABOLIC PANEL: CPT | Performed by: EMERGENCY MEDICINE

## 2021-02-21 PROCEDURE — 74177 CT ABD & PELVIS W/CONTRAST: CPT

## 2021-02-21 PROCEDURE — 25010000002 HYDROMORPHONE PER 4 MG: Performed by: EMERGENCY MEDICINE

## 2021-02-21 PROCEDURE — 83690 ASSAY OF LIPASE: CPT | Performed by: EMERGENCY MEDICINE

## 2021-02-21 RX ORDER — HYDROMORPHONE HYDROCHLORIDE 1 MG/ML
0.5 INJECTION, SOLUTION INTRAMUSCULAR; INTRAVENOUS; SUBCUTANEOUS ONCE
Status: COMPLETED | OUTPATIENT
Start: 2021-02-21 | End: 2021-02-21

## 2021-02-21 RX ORDER — ONDANSETRON 2 MG/ML
4 INJECTION INTRAMUSCULAR; INTRAVENOUS ONCE
Status: COMPLETED | OUTPATIENT
Start: 2021-02-21 | End: 2021-02-21

## 2021-02-21 RX ORDER — SODIUM CHLORIDE 9 MG/ML
10 INJECTION INTRAVENOUS AS NEEDED
Status: DISCONTINUED | OUTPATIENT
Start: 2021-02-21 | End: 2021-02-21 | Stop reason: HOSPADM

## 2021-02-21 RX ORDER — MORPHINE SULFATE 4 MG/ML
4 INJECTION, SOLUTION INTRAMUSCULAR; INTRAVENOUS ONCE
Status: COMPLETED | OUTPATIENT
Start: 2021-02-21 | End: 2021-02-21

## 2021-02-21 RX ORDER — SODIUM CHLORIDE 0.9 % (FLUSH) 0.9 %
10 SYRINGE (ML) INJECTION AS NEEDED
Status: DISCONTINUED | OUTPATIENT
Start: 2021-02-21 | End: 2021-02-21 | Stop reason: HOSPADM

## 2021-02-21 RX ADMIN — MORPHINE SULFATE 4 MG: 4 INJECTION, SOLUTION INTRAMUSCULAR; INTRAVENOUS at 14:47

## 2021-02-21 RX ADMIN — HYDROMORPHONE HYDROCHLORIDE 0.5 MG: 1 INJECTION, SOLUTION INTRAMUSCULAR; INTRAVENOUS; SUBCUTANEOUS at 15:35

## 2021-02-21 RX ADMIN — IOPAMIDOL 90 ML: 612 INJECTION, SOLUTION INTRAVENOUS at 15:10

## 2021-02-21 RX ADMIN — ONDANSETRON 4 MG: 2 INJECTION INTRAMUSCULAR; INTRAVENOUS at 14:45

## 2021-02-21 NOTE — ED PROVIDER NOTES
Subjective   Patient is a 30-year-old female presents emergency room today with complaints of right lower quadrant abdominal pain.  Patient states that upon awakening this morning at approximately 7:30 AM she began to experience some pain around her middle abdomen.  She describes the pain as a aching and pulsating pain.  She said it calmed down a bit but then seemed to move into her right lower quadrant.  She then began to experience nausea and vomiting.  She denies fever.  She reports the pain is worse with movement and that nothing specific helps to alleviate the pain.  Patient shares that she still has her appendix but has had her gallbladder removed.  She has history of a gastric sleeve.  She reports no additional associated symptoms on interview and exam.      Abdominal Pain  Pain location:  Periumbilical and RLQ  Pain quality: aching, sharp and throbbing    Pain radiates to:  Does not radiate  Pain severity:  Severe  Onset quality:  Gradual  Duration:  6 hours  Timing:  Constant  Progression:  Worsening  Chronicity:  New  Relieved by:  Nothing  Worsened by:  Position changes and movement  Ineffective treatments:  None tried  Associated symptoms: nausea and vomiting    Associated symptoms: no constipation, no diarrhea and no fever    Risk factors: multiple surgeries and obesity        Review of Systems   Constitutional: Negative for fever.   HENT: Negative.    Respiratory: Negative.    Cardiovascular: Negative.    Gastrointestinal: Positive for abdominal pain, nausea and vomiting. Negative for constipation and diarrhea.   Genitourinary: Negative.    Musculoskeletal: Negative.    Skin: Negative.    Neurological: Negative.    All other systems reviewed and are negative.      Past Medical History:   Diagnosis Date   • Anxiety    • Asthma    • Dysuria    • Glossitis    •  0    • Sore throat    • Strep pharyngitis        Allergies   Allergen Reactions   • Nsaids Hives   • Reglan [Metoclopramide] Other (See  "Comments)     \"makes me aggressive\"       Past Surgical History:   Procedure Laterality Date   • CHOLECYSTECTOMY     • ERCP     • GASTRIC SLEEVE LAPAROSCOPIC         Family History   Problem Relation Age of Onset   • Hypertension Mother    • Hypertension Father        Social History     Socioeconomic History   • Marital status: Single     Spouse name: Not on file   • Number of children: Not on file   • Years of education: Not on file   • Highest education level: Not on file   Tobacco Use   • Smoking status: Former Smoker     Quit date: 2019     Years since quittin.1   • Smokeless tobacco: Never Used   Substance and Sexual Activity   • Alcohol use: Not Currently   • Drug use: Never           Objective   Physical Exam  Vitals signs and nursing note reviewed.   Constitutional:       General: She is not in acute distress.     Appearance: She is well-developed. She is obese. She is not ill-appearing or toxic-appearing.   HENT:      Head: Normocephalic and atraumatic.   Eyes:      Extraocular Movements: Extraocular movements intact.      Conjunctiva/sclera: Conjunctivae normal.   Neck:      Musculoskeletal: Normal range of motion and neck supple.   Cardiovascular:      Rate and Rhythm: Normal rate and regular rhythm.   Pulmonary:      Effort: Pulmonary effort is normal. No respiratory distress.      Breath sounds: Normal breath sounds.   Abdominal:      General: There is no distension.      Palpations: Abdomen is soft.      Tenderness: There is abdominal tenderness in the right lower quadrant and periumbilical area. There is guarding. Positive signs include psoas sign and obturator sign.   Musculoskeletal: Normal range of motion.   Skin:     General: Skin is warm and dry.   Neurological:      General: No focal deficit present.      Mental Status: She is alert.   Psychiatric:         Behavior: Behavior normal.         Thought Content: Thought content normal.         Judgment: Judgment normal.         Procedures     "       ED Course  ED Course as of Feb 21 1656   Sun Feb 21, 2021   1558 On reassessment patient states that she is feeling much better.  She shares that she has medication at home for abdominal cramping, she is prescribed Bentyl.  Patient also has symptomatic management for nausea and vomiting with prescribed Phenergan and Zofran.  She states that she will follow-up with her surgeon and gastroenterologist tomorrow.    [JG]   1654 30-year-old female presents to ER with complaints of right lower quadrant abdominal pain.  Diffusely tender in right lower quadrant on physical exam.  No additional acute or emergent abnormalities.  Labs and urinalysis without acute or emergent abnormalities.    [JG]      ED Course User Index  [JG] Christiano Barrera, PA           Recent Results (from the past 24 hour(s))   Comprehensive Metabolic Panel    Collection Time: 02/21/21  1:12 PM    Specimen: Blood   Result Value Ref Range    Glucose 88 65 - 99 mg/dL    BUN 13 6 - 20 mg/dL    Creatinine 0.51 (L) 0.57 - 1.00 mg/dL    Sodium 139 136 - 145 mmol/L    Potassium 3.7 3.5 - 5.2 mmol/L    Chloride 104 98 - 107 mmol/L    CO2 25.0 22.0 - 29.0 mmol/L    Calcium 9.4 8.6 - 10.5 mg/dL    Total Protein 7.4 6.0 - 8.5 g/dL    Albumin 4.60 3.50 - 5.20 g/dL    ALT (SGPT) 14 1 - 33 U/L    AST (SGOT) 17 1 - 32 U/L    Alkaline Phosphatase 49 39 - 117 U/L    Total Bilirubin 0.5 0.0 - 1.2 mg/dL    eGFR Non African Amer 143 >60 mL/min/1.73    Globulin 2.8 gm/dL    A/G Ratio 1.6 g/dL    BUN/Creatinine Ratio 25.5 (H) 7.0 - 25.0    Anion Gap 10.0 5.0 - 15.0 mmol/L   Lipase    Collection Time: 02/21/21  1:12 PM    Specimen: Blood   Result Value Ref Range    Lipase 47 13 - 60 U/L   Light Blue Top    Collection Time: 02/21/21  1:12 PM   Result Value Ref Range    Extra Tube hold for add-on    Green Top (Gel)    Collection Time: 02/21/21  1:12 PM   Result Value Ref Range    Extra Tube Hold for add-ons.    Lavender Top    Collection Time: 02/21/21  1:12 PM   Result  Value Ref Range    Extra Tube hold for add-on    Gold Top - SST    Collection Time: 02/21/21  1:12 PM   Result Value Ref Range    Extra Tube Hold for add-ons.    Gray Top - Ice    Collection Time: 02/21/21  1:12 PM   Result Value Ref Range    Extra Tube Hold for add-ons.    CBC Auto Differential    Collection Time: 02/21/21  1:12 PM    Specimen: Blood   Result Value Ref Range    WBC 6.02 3.40 - 10.80 10*3/mm3    RBC 3.89 3.77 - 5.28 10*6/mm3    Hemoglobin 11.6 (L) 12.0 - 15.9 g/dL    Hematocrit 34.0 34.0 - 46.6 %    MCV 87.4 79.0 - 97.0 fL    MCH 29.8 26.6 - 33.0 pg    MCHC 34.1 31.5 - 35.7 g/dL    RDW 12.6 12.3 - 15.4 %    RDW-SD 40.0 37.0 - 54.0 fl    MPV 9.2 6.0 - 12.0 fL    Platelets 287 140 - 450 10*3/mm3    Neutrophil % 48.5 42.7 - 76.0 %    Lymphocyte % 44.2 19.6 - 45.3 %    Monocyte % 4.5 (L) 5.0 - 12.0 %    Eosinophil % 1.8 0.3 - 6.2 %    Basophil % 0.8 0.0 - 1.5 %    Immature Grans % 0.2 0.0 - 0.5 %    Neutrophils, Absolute 2.92 1.70 - 7.00 10*3/mm3    Lymphocytes, Absolute 2.66 0.70 - 3.10 10*3/mm3    Monocytes, Absolute 0.27 0.10 - 0.90 10*3/mm3    Eosinophils, Absolute 0.11 0.00 - 0.40 10*3/mm3    Basophils, Absolute 0.05 0.00 - 0.20 10*3/mm3    Immature Grans, Absolute 0.01 0.00 - 0.05 10*3/mm3    nRBC 0.0 0.0 - 0.2 /100 WBC   Lactic Acid, Plasma    Collection Time: 02/21/21  1:12 PM    Specimen: Blood   Result Value Ref Range    Lactate 0.6 0.5 - 2.0 mmol/L   Urinalysis With Microscopic If Indicated (No Culture) - Urine, Clean Catch    Collection Time: 02/21/21  2:32 PM    Specimen: Urine, Clean Catch   Result Value Ref Range    Color, UA Yellow Yellow, Straw    Appearance, UA Clear Clear    pH, UA 7.0 5.0 - 8.0    Specific Gravity, UA 1.009 1.001 - 1.030    Glucose, UA Negative Negative    Ketones, UA Negative Negative    Bilirubin, UA Negative Negative    Blood, UA Moderate (2+) (A) Negative    Protein, UA Negative Negative    Leuk Esterase, UA Negative Negative    Nitrite, UA Negative Negative     "Urobilinogen, UA 1.0 E.U./dL 0.2 - 1.0 E.U./dL   Urinalysis, Microscopic Only - Urine, Clean Catch    Collection Time: 02/21/21  2:32 PM    Specimen: Urine, Clean Catch   Result Value Ref Range    RBC, UA 0-2 None Seen, 0-2 /HPF    WBC, UA 0-2 None Seen, 0-2 /HPF    Bacteria, UA None Seen None Seen, Trace /HPF    Squamous Epithelial Cells, UA 0-2 None Seen, 0-2 /HPF    Hyaline Casts, UA 0-6 0 - 6 /LPF    Methodology Automated Microscopy    POC Pregnancy, Urine    Collection Time: 02/21/21  2:37 PM    Specimen: Urine   Result Value Ref Range    HCG, Urine, QL Negative Negative    Lot Number [id number]     Internal Positive Control Positive     Internal Negative Control Negative      Note: In addition to lab results from this visit, the labs listed above may include labs taken at another facility or during a different encounter within the last 24 hours. Please correlate lab times with ED admission and discharge times for further clarification of the services performed during this visit.    CT Abdomen Pelvis With Contrast   Final Result   No acute findings in the abdomen and pelvis.           This report was finalized on 2/21/2021 3:39 PM by Jace Lizama.            Vitals:    02/21/21 1249 02/21/21 1400 02/21/21 1445 02/21/21 1600   BP: 117/78 114/72  107/62   BP Location: Left arm      Patient Position: Sitting      Pulse: 87 66 67 65   Resp: 18      Temp: 98 °F (36.7 °C)      SpO2: 98% 98% 97% 98%   Weight: 68 kg (150 lb)      Height: 162.6 cm (64\")        Medications   Sodium Chloride (PF) 0.9 % 10 mL (has no administration in time range)   sodium chloride 0.9 % flush 10 mL (has no administration in time range)   ondansetron (ZOFRAN) injection 4 mg (4 mg Intravenous Given 2/21/21 1445)   Morphine sulfate (PF) injection 4 mg (4 mg Intravenous Given 2/21/21 1447)   iopamidol (ISOVUE-300) 61 % injection 100 mL (90 mL Intravenous Given 2/21/21 1510)   HYDROmorphone (DILAUDID) injection 0.5 mg (0.5 mg Intravenous " Given 2/21/21 1395)     ECG/EMG Results (last 24 hours)     ** No results found for the last 24 hours. **        No orders to display     DISCHARGE    Patient discharged in stable condition.    Reviewed implications of results, diagnosis, meds, responsibility to follow up, warning signs and symptoms of possible worsening, potential complications and reasons to return to ER.    Patient/Family voiced understanding of above instructions.    Discussed plan for discharge, as there is no emergent indication for admission.  Pt/family is agreeable and understands need for follow up and possible repeat testing.  Pt/family is aware that discharge does not mean that nothing is wrong but that it indicates no emergency is currently present that requires admission and they must continue care with follow-up as given below or with a physician of their choice.     FOLLOW-UP  Lucio Dexter MD  [address]  [place] [id number] [phone]    Schedule an appointment as soon as possible for a visit       Joanne Acevedo APRN  [address]  [address]  [place] [id number]  [phone]    Schedule an appointment as soon as possible for a visit       [facility] Emergency Department  [address] [place] [id number] [phone]  Go to   If symptoms worsen         Medication List      No changes were made to your prescriptions during this visit.                                     MDM  Number of Diagnoses or Management Options  Nausea and vomiting, intractability of vomiting not specified, unspecified vomiting type: new and requires workup  Right lower quadrant abdominal pain: new and requires workup     Amount and/or Complexity of Data Reviewed  Clinical lab tests: reviewed  Tests in the radiology section of CPT®: reviewed    Risk of Complications, Morbidity, and/or Mortality  Presenting problems: moderate  Diagnostic procedures: moderate  Management options:  moderate    Patient Progress  Patient progress: improved      Final diagnoses:   Right lower quadrant abdominal pain   Nausea and vomiting, intractability of vomiting not specified, unspecified vomiting type            Christiano Barrera, PA  02/21/21 9050

## 2021-02-21 NOTE — DISCHARGE INSTRUCTIONS
Symptomatic care is recommended. Take all medications as prescribed and instructed. Follow up with your surgeon and gastroenterologist as directed or return to Emergency Department with worsening of symptoms.

## 2021-03-12 ENCOUNTER — HOSPITAL ENCOUNTER (EMERGENCY)
Facility: HOSPITAL | Age: 31
Discharge: LEFT BEFORE DISCHARGE | End: 2021-03-12
Attending: EMERGENCY MEDICINE | Admitting: EMERGENCY MEDICINE

## 2021-03-12 VITALS
RESPIRATION RATE: 18 BRPM | SYSTOLIC BLOOD PRESSURE: 108 MMHG | OXYGEN SATURATION: 97 % | TEMPERATURE: 99.3 F | WEIGHT: 145 LBS | HEART RATE: 98 BPM | DIASTOLIC BLOOD PRESSURE: 74 MMHG | BODY MASS INDEX: 24.75 KG/M2 | HEIGHT: 64 IN

## 2021-03-12 DIAGNOSIS — G89.29 CHRONIC ABDOMINAL PAIN: Primary | ICD-10-CM

## 2021-03-12 DIAGNOSIS — Z76.5 DRUG-SEEKING BEHAVIOR: ICD-10-CM

## 2021-03-12 DIAGNOSIS — R10.9 CHRONIC ABDOMINAL PAIN: Primary | ICD-10-CM

## 2021-03-12 LAB
ALBUMIN SERPL-MCNC: 4.7 G/DL (ref 3.5–5.2)
ALBUMIN/GLOB SERPL: 1.5 G/DL
ALP SERPL-CCNC: 59 U/L (ref 39–117)
ALT SERPL W P-5'-P-CCNC: 10 U/L (ref 1–33)
ANION GAP SERPL CALCULATED.3IONS-SCNC: 10 MMOL/L (ref 5–15)
AST SERPL-CCNC: 12 U/L (ref 1–32)
B-HCG UR QL: NEGATIVE
BASOPHILS # BLD AUTO: 0.05 10*3/MM3 (ref 0–0.2)
BASOPHILS NFR BLD AUTO: 0.5 % (ref 0–1.5)
BILIRUB SERPL-MCNC: 0.7 MG/DL (ref 0–1.2)
BILIRUB UR QL STRIP: NEGATIVE
BUN SERPL-MCNC: 10 MG/DL (ref 6–20)
BUN/CREAT SERPL: 17.9 (ref 7–25)
CALCIUM SPEC-SCNC: 9.2 MG/DL (ref 8.6–10.5)
CHLORIDE SERPL-SCNC: 104 MMOL/L (ref 98–107)
CLARITY UR: CLEAR
CO2 SERPL-SCNC: 23 MMOL/L (ref 22–29)
COLOR UR: YELLOW
CREAT SERPL-MCNC: 0.56 MG/DL (ref 0.57–1)
DEPRECATED RDW RBC AUTO: 42 FL (ref 37–54)
EOSINOPHIL # BLD AUTO: 0.15 10*3/MM3 (ref 0–0.4)
EOSINOPHIL NFR BLD AUTO: 1.5 % (ref 0.3–6.2)
ERYTHROCYTE [DISTWIDTH] IN BLOOD BY AUTOMATED COUNT: 12.8 % (ref 12.3–15.4)
GFR SERPL CREATININE-BSD FRML MDRD: 128 ML/MIN/1.73
GLOBULIN UR ELPH-MCNC: 3.2 GM/DL
GLUCOSE SERPL-MCNC: 82 MG/DL (ref 65–99)
GLUCOSE UR STRIP-MCNC: NEGATIVE MG/DL
HCT VFR BLD AUTO: 38.7 % (ref 34–46.6)
HGB BLD-MCNC: 12.9 G/DL (ref 12–15.9)
HGB UR QL STRIP.AUTO: NEGATIVE
HOLD SPECIMEN: NORMAL
IMM GRANULOCYTES # BLD AUTO: 0.04 10*3/MM3 (ref 0–0.05)
IMM GRANULOCYTES NFR BLD AUTO: 0.4 % (ref 0–0.5)
INTERNAL NEGATIVE CONTROL: NORMAL
INTERNAL POSITIVE CONTROL: NORMAL
KETONES UR QL STRIP: NEGATIVE
LEUKOCYTE ESTERASE UR QL STRIP.AUTO: NEGATIVE
LIPASE SERPL-CCNC: 43 U/L (ref 13–60)
LYMPHOCYTES # BLD AUTO: 2.21 10*3/MM3 (ref 0.7–3.1)
LYMPHOCYTES NFR BLD AUTO: 22.8 % (ref 19.6–45.3)
Lab: NORMAL
MCH RBC QN AUTO: 30.4 PG (ref 26.6–33)
MCHC RBC AUTO-ENTMCNC: 33.3 G/DL (ref 31.5–35.7)
MCV RBC AUTO: 91.1 FL (ref 79–97)
MONOCYTES # BLD AUTO: 0.55 10*3/MM3 (ref 0.1–0.9)
MONOCYTES NFR BLD AUTO: 5.7 % (ref 5–12)
NEUTROPHILS NFR BLD AUTO: 6.71 10*3/MM3 (ref 1.7–7)
NEUTROPHILS NFR BLD AUTO: 69.1 % (ref 42.7–76)
NITRITE UR QL STRIP: NEGATIVE
NRBC BLD AUTO-RTO: 0 /100 WBC (ref 0–0.2)
PH UR STRIP.AUTO: 5.5 [PH] (ref 5–8)
PLATELET # BLD AUTO: 248 10*3/MM3 (ref 140–450)
PMV BLD AUTO: 9.3 FL (ref 6–12)
POTASSIUM SERPL-SCNC: 3.7 MMOL/L (ref 3.5–5.2)
PROT SERPL-MCNC: 7.9 G/DL (ref 6–8.5)
PROT UR QL STRIP: NEGATIVE
RBC # BLD AUTO: 4.25 10*6/MM3 (ref 3.77–5.28)
SODIUM SERPL-SCNC: 137 MMOL/L (ref 136–145)
SP GR UR STRIP: 1.01 (ref 1–1.03)
UROBILINOGEN UR QL STRIP: NORMAL
WBC # BLD AUTO: 9.71 10*3/MM3 (ref 3.4–10.8)
WHOLE BLOOD HOLD SPECIMEN: NORMAL
WHOLE BLOOD HOLD SPECIMEN: NORMAL

## 2021-03-12 PROCEDURE — 85025 COMPLETE CBC W/AUTO DIFF WBC: CPT

## 2021-03-12 PROCEDURE — 81025 URINE PREGNANCY TEST: CPT | Performed by: EMERGENCY MEDICINE

## 2021-03-12 PROCEDURE — 99283 EMERGENCY DEPT VISIT LOW MDM: CPT

## 2021-03-12 PROCEDURE — 81025 URINE PREGNANCY TEST: CPT

## 2021-03-12 PROCEDURE — 81003 URINALYSIS AUTO W/O SCOPE: CPT

## 2021-03-12 PROCEDURE — 83690 ASSAY OF LIPASE: CPT

## 2021-03-12 PROCEDURE — 80053 COMPREHEN METABOLIC PANEL: CPT

## 2021-03-12 RX ORDER — DIAZEPAM 5 MG/1
5 TABLET ORAL 2 TIMES DAILY PRN
COMMUNITY

## 2021-03-12 RX ORDER — SODIUM CHLORIDE 9 MG/ML
10 INJECTION INTRAVENOUS AS NEEDED
Status: DISCONTINUED | OUTPATIENT
Start: 2021-03-12 | End: 2021-03-12 | Stop reason: HOSPADM

## 2021-03-12 RX ORDER — HALOPERIDOL 5 MG/ML
5 INJECTION INTRAMUSCULAR ONCE
Status: DISCONTINUED | OUTPATIENT
Start: 2021-03-12 | End: 2021-03-12 | Stop reason: HOSPADM

## 2021-03-12 NOTE — ED PROVIDER NOTES
"Subjective   Ms. Mccarthy is a 29 yo female here with ongoing problems with abdominal pain. Her pain today is typical, all over, associated with nausea and vomiting. She has had pancreatitis and is worried that she has it again. She also complains of strange markings on her back. She denies use of a heating pad. She has ondansetron and promethazine at home for her N/V. She states she has a GI specialist, Ms. Acevedo, at [facility]. She denies any recent hospitalizations or going to any other hospital for abdominal pain problems.  Past and social histories resolved. She denies illicit drugs.    Of note is that I have not only reviewed our records but [facility] records, too. She has had numerous visits to [facility] with this same problems with admission 3/9. Numerous CT scans of abd/pelvis there, too. Admission note from [facility] points out that she has had numerous visits to [facility] ED for same as well as an admission there. Malingering and drug-seeking behavior suspected.      History provided by:  Medical records and patient      Review of Systems   Constitutional: Negative.    HENT: Negative.    Respiratory: Negative.  Negative for shortness of breath.    Cardiovascular: Negative.  Negative for chest pain.   Gastrointestinal: Positive for abdominal pain, nausea and vomiting.   Neurological: Negative.    Psychiatric/Behavioral: Negative.    All other systems reviewed and are negative.      Past Medical History:   Diagnosis Date   • Anxiety    • Asthma    • Dysuria    • Glossitis    •  0    • Sore throat    • Strep pharyngitis        Allergies   Allergen Reactions   • Nsaids Hives   • Reglan [Metoclopramide] Other (See Comments)     \"makes me aggressive\"       Past Surgical History:   Procedure Laterality Date   • CHOLECYSTECTOMY     • ERCP     • GASTRIC SLEEVE LAPAROSCOPIC         Family History   Problem Relation Age of Onset   • Hypertension Mother    • Hypertension Father        Social History     Socioeconomic History   • " Marital status: Single     Spouse name: Not on file   • Number of children: Not on file   • Years of education: Not on file   • Highest education level: Not on file   Tobacco Use   • Smoking status: Former Smoker     Quit date: 2019     Years since quittin.1   • Smokeless tobacco: Never Used   Substance and Sexual Activity   • Alcohol use: Yes     Alcohol/week: 1.0 standard drinks     Types: 1 Glasses of wine per week     Comment: socially   • Drug use: Never           Objective   Physical Exam  Vitals and nursing note reviewed.   Constitutional:       Comments: Young adult female, lying on her right side in a fetal position.   HENT:      Head: Atraumatic.      Mouth/Throat:      Comments: Airway patent  Eyes:      Conjunctiva/sclera: Conjunctivae normal.   Neck:      Trachea: Phonation normal.   Cardiovascular:      Rate and Rhythm: Regular rhythm. Tachycardia present.      Heart sounds: Normal heart sounds.   Pulmonary:      Effort: Pulmonary effort is normal. No respiratory distress.      Breath sounds: Normal breath sounds.   Abdominal:      Palpations: Abdomen is soft.      Tenderness: There is abdominal tenderness. There is no guarding.   Musculoskeletal:      Cervical back: Neck supple.      Right lower leg: No edema.      Left lower leg: No edema.   Skin:     General: Skin is warm and dry.      Comments: She has a brownish hued skin pattern on her back consistent with overuse of a heating pad (she denies using a heating pad, though).   Neurological:      Mental Status: She is alert and oriented to person, place, and time.   Psychiatric:      Comments: Miserable           Procedures           ED Course  ED Course as of Mar 12 1919   Fri Mar 12, 2021   1231 After exam, I confronted her with my knowledge that she had pursued medical care elsewhere numerous times with a recent hospitalization. As I had asked her specifically about seeking other hospital care for this pain and about admission for this  problem, and she denied that, in fact said last hospitalization was 2015, I confronted her with her deceptions, told her I would treat her symptoms without narcotics. Before I had a chance to write orders, she left, telling the nurse it was because of my comments.  She obviously has a chronic problem of which abdominal pain is a major symptom, but she does also follow a pattern of drug seeking behavior. Unfortunately, she is not likely to stop such behavior.  Labs all reviewed and benign, including mildly hypotonic urine specific gravity.    [LI]      ED Course User Index  [LI] Farhad Shukla MD      Recent Results (from the past 24 hour(s))   Comprehensive Metabolic Panel    Collection Time: 03/12/21 11:13 AM    Specimen: Blood   Result Value Ref Range    Glucose 82 65 - 99 mg/dL    BUN 10 6 - 20 mg/dL    Creatinine 0.56 (L) 0.57 - 1.00 mg/dL    Sodium 137 136 - 145 mmol/L    Potassium 3.7 3.5 - 5.2 mmol/L    Chloride 104 98 - 107 mmol/L    CO2 23.0 22.0 - 29.0 mmol/L    Calcium 9.2 8.6 - 10.5 mg/dL    Total Protein 7.9 6.0 - 8.5 g/dL    Albumin 4.70 3.50 - 5.20 g/dL    ALT (SGPT) 10 1 - 33 U/L    AST (SGOT) 12 1 - 32 U/L    Alkaline Phosphatase 59 39 - 117 U/L    Total Bilirubin 0.7 0.0 - 1.2 mg/dL    eGFR Non African Amer 128 >60 mL/min/1.73    Globulin 3.2 gm/dL    A/G Ratio 1.5 g/dL    BUN/Creatinine Ratio 17.9 7.0 - 25.0    Anion Gap 10.0 5.0 - 15.0 mmol/L   Lipase    Collection Time: 03/12/21 11:13 AM    Specimen: Blood   Result Value Ref Range    Lipase 43 13 - 60 U/L   Urinalysis With Microscopic If Indicated (No Culture) - Urine, Clean Catch    Collection Time: 03/12/21 11:13 AM    Specimen: Urine, Clean Catch   Result Value Ref Range    Color, UA Yellow Yellow, Straw    Appearance, UA Clear Clear    pH, UA 5.5 5.0 - 8.0    Specific Gravity, UA 1.011 1.001 - 1.030    Glucose, UA Negative Negative    Ketones, UA Negative Negative    Bilirubin, UA Negative Negative    Blood, UA Negative Negative     Protein, UA Negative Negative    Leuk Esterase, UA Negative Negative    Nitrite, UA Negative Negative    Urobilinogen, UA 0.2 E.U./dL 0.2 - 1.0 E.U./dL   Light Blue Top    Collection Time: 03/12/21 11:13 AM   Result Value Ref Range    Extra Tube hold for add-on    Green Top (Gel)    Collection Time: 03/12/21 11:13 AM   Result Value Ref Range    Extra Tube Hold for add-ons.    Lavender Top    Collection Time: 03/12/21 11:13 AM   Result Value Ref Range    Extra Tube     Gold Top - SST    Collection Time: 03/12/21 11:13 AM   Result Value Ref Range    Extra Tube Hold for add-ons.    Gray Top - Ice    Collection Time: 03/12/21 11:13 AM   Result Value Ref Range    Extra Tube Hold for add-ons.    CBC Auto Differential    Collection Time: 03/12/21 11:13 AM    Specimen: Blood   Result Value Ref Range    WBC 9.71 3.40 - 10.80 10*3/mm3    RBC 4.25 3.77 - 5.28 10*6/mm3    Hemoglobin 12.9 12.0 - 15.9 g/dL    Hematocrit 38.7 34.0 - 46.6 %    MCV 91.1 79.0 - 97.0 fL    MCH 30.4 26.6 - 33.0 pg    MCHC 33.3 31.5 - 35.7 g/dL    RDW 12.8 12.3 - 15.4 %    RDW-SD 42.0 37.0 - 54.0 fl    MPV 9.3 6.0 - 12.0 fL    Platelets 248 140 - 450 10*3/mm3    Neutrophil % 69.1 42.7 - 76.0 %    Lymphocyte % 22.8 19.6 - 45.3 %    Monocyte % 5.7 5.0 - 12.0 %    Eosinophil % 1.5 0.3 - 6.2 %    Basophil % 0.5 0.0 - 1.5 %    Immature Grans % 0.4 0.0 - 0.5 %    Neutrophils, Absolute 6.71 1.70 - 7.00 10*3/mm3    Lymphocytes, Absolute 2.21 0.70 - 3.10 10*3/mm3    Monocytes, Absolute 0.55 0.10 - 0.90 10*3/mm3    Eosinophils, Absolute 0.15 0.00 - 0.40 10*3/mm3    Basophils, Absolute 0.05 0.00 - 0.20 10*3/mm3    Immature Grans, Absolute 0.04 0.00 - 0.05 10*3/mm3    nRBC 0.0 0.0 - 0.2 /100 WBC   POC Pregnancy, Urine    Collection Time: 03/12/21 11:21 AM    Specimen: Urine   Result Value Ref Range    HCG, Urine, QL Negative Negative    Lot Number [id number]     Internal Positive Control Presumptive Positive     Internal Negative Control Presumptive Negative   "    Note: In addition to lab results from this visit, the labs listed above may include labs taken at another facility or during a different encounter within the last 24 hours. Please correlate lab times with ED admission and discharge times for further clarification of the services performed during this visit.    No orders to display     Vitals:    03/12/21 1047 03/12/21 1200   BP: 108/75 108/74   BP Location: Left arm    Patient Position: Sitting    Pulse: 102 98   Resp: 18    Temp: 99.3 °F (37.4 °C)    TempSrc: Oral    SpO2: 98% 97%   Weight: 65.8 kg (145 lb)    Height: 162.6 cm (64\")      Medications - No data to display  ECG/EMG Results (last 24 hours)     ** No results found for the last 24 hours. **        No orders to display                                            MDM    Final diagnoses:   Chronic abdominal pain   Drug-seeking behavior            Farhad Shukla MD  03/12/21 1919    "